# Patient Record
Sex: FEMALE | Race: WHITE | NOT HISPANIC OR LATINO | Employment: FULL TIME | URBAN - METROPOLITAN AREA
[De-identification: names, ages, dates, MRNs, and addresses within clinical notes are randomized per-mention and may not be internally consistent; named-entity substitution may affect disease eponyms.]

---
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
## 2024-01-10 PROCEDURE — 99283 EMERGENCY DEPT VISIT LOW MDM: CPT

## 2024-01-11 ENCOUNTER — HOSPITAL ENCOUNTER (EMERGENCY)
Facility: HOSPITAL | Age: 19
Discharge: HOME | End: 2024-01-11
Attending: EMERGENCY MEDICINE
Payer: OTHER MISCELLANEOUS

## 2024-01-11 VITALS
WEIGHT: 246.25 LBS | TEMPERATURE: 98.6 F | RESPIRATION RATE: 18 BRPM | HEART RATE: 57 BPM | OXYGEN SATURATION: 99 % | BODY MASS INDEX: 37.32 KG/M2 | DIASTOLIC BLOOD PRESSURE: 69 MMHG | SYSTOLIC BLOOD PRESSURE: 120 MMHG | HEIGHT: 68 IN

## 2024-01-11 DIAGNOSIS — S46.912A MUSCLE STRAIN OF LEFT SHOULDER REGION, INITIAL ENCOUNTER: Primary | ICD-10-CM

## 2024-01-11 PROCEDURE — 63710000001 PREDNISONE PER 5 MG: Performed by: NURSE PRACTITIONER

## 2024-01-11 RX ORDER — DICLOFENAC SODIUM 75 MG/1
75 TABLET, DELAYED RELEASE ORAL 2 TIMES DAILY
Qty: 20 TABLET | Refills: 0 | Status: SHIPPED | OUTPATIENT
Start: 2024-01-11 | End: 2024-01-21

## 2024-01-11 RX ORDER — PREDNISONE 50 MG/1
50 TABLET ORAL ONCE
Status: COMPLETED | OUTPATIENT
Start: 2024-01-11 | End: 2024-01-11

## 2024-01-11 RX ORDER — IBUPROFEN 600 MG/1
600 TABLET ORAL ONCE
Status: COMPLETED | OUTPATIENT
Start: 2024-01-11 | End: 2024-01-11

## 2024-01-11 RX ADMIN — IBUPROFEN 600 MG: 600 TABLET, FILM COATED ORAL at 03:36

## 2024-01-11 RX ADMIN — PREDNISONE 50 MG: 50 TABLET ORAL at 03:36

## 2024-01-11 NOTE — DISCHARGE INSTRUCTIONS
Rest, gentle range of motion stretches followed by 20 minutes of ice several times a day.  Take your meds as prescribed.  You may take over-the-counter acetaminophen with these medications as needed for pain.  Call work well after 7:30 in the morning and follow-up with them as directed for further evaluation and treatment.  Return to the emergency department for any acutely developing redness, any increase in swelling, any inability to flex or extend your shoulder or arm or any new or worse concerns.

## 2024-01-11 NOTE — ED PROVIDER NOTES
Time: 10:52 PM EST  Date of encounter:  1/10/2024  Independent Historian/Clinical History and Information was obtained by:   Patient    History is limited by: N/A    Chief Complaint   Patient presents with    Arm Pain         History of Present Illness:  The patient is a 18 y.o. year old female who presents to the emergency department for evaluation of left arm pain for 2 days.  She is complaining of left arm pain with movement.  The patient states that she hurt her arm while moving equipment at work on Monday and believes that she pulled something.  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  She states that she is continue to work all week lifting heavy things and reports that her pain has not improved since then.  She is neurovascular intact.  She is able to flex and extend the shoulder elbow and wrist without difficulties.  She states when she flexes her elbow it does increase the pain in her shoulder and does have tenderness in the anterior portion of her shoulder.  She denies any previous injuries to that shoulder.  She denies numbness and tingling.    Patient Care Team  Primary Care Provider: Ron Briggs MD    Past Medical History:     No Known Allergies  Past Medical History:   Diagnosis Date    Gall stones     Heart murmur      Past Surgical History:   Procedure Laterality Date    CHOLECYSTECTOMY N/A 1/11/2022    Procedure: CHOLECYSTECTOMY LAPAROSCOPIC;  Surgeon: Charanjit Zambrano MD;  Location: [facility]  Service: General;  Laterality: N/A;     History reviewed. No pertinent family history.    Home Medications:  Prior to Admission medications    Medication Sig Start Date End Date Taking? Authorizing Provider   azelastine (ASTELIN) 0.1 % nasal spray 2 sprays into the nostril(s) as directed by provider 2 (Two) Times a Day. Use in each nostril as directed 12/11/22   Larry Murillo PA  "  brompheniramine-pseudoephedrine-DM 30-2-10 MG/5ML syrup Take 10 mL by mouth 4 (Four) Times a Day As Needed for Congestion, Cough or Allergies. 12/11/22   Larry Murillo PA   fluticasone (FLONASE) 50 MCG/ACT nasal spray 2 sprays into the nostril(s) as directed by provider Daily. 12/11/22   Larry Murillo PA   ibuprofen (ADVIL,MOTRIN) 600 MG tablet Take 600 mg by mouth Every 6 (Six) Hours As Needed for Mild Pain .    ProviderSarthak MD   norethindrone-ethinyl estradiol (FEMHRT 1/5) 1-5 MG-MCG tablet Take  by mouth Daily.    ProviderSarthak MD   oxyCODONE-acetaminophen (Percocet) 5-325 MG per tablet Take 1 tablet by mouth Every 6 (Six) Hours As Needed for Moderate Pain . 1/12/22   Charanjit Zambrano MD   polyethylene glycol (MIRALAX) 17 g packet Take 17 g by mouth Daily. 1/12/22   Charanjit Zambrano MD        Social History:   Social History     Tobacco Use    Smoking status: Never    Smokeless tobacco: Never   Vaping Use    Vaping Use: Never used   Substance Use Topics    Alcohol use: Never    Drug use: Never         Review of Systems:  Review of Systems   Constitutional:  Negative for chills and fever.   HENT:  Negative for congestion, ear pain and sore throat.    Eyes:  Negative for pain.   Respiratory:  Negative for cough, chest tightness and shortness of breath.    Cardiovascular:  Negative for chest pain.   Gastrointestinal:  Negative for abdominal pain, diarrhea, nausea and vomiting.   Genitourinary:  Negative for flank pain and hematuria.   Musculoskeletal:  Positive for arthralgias and myalgias. Negative for back pain, joint swelling, neck pain and neck stiffness.   Skin:  Negative for color change, pallor and rash.   Neurological:  Negative for seizures and headaches.   All other systems reviewed and are negative.       Physical Exam:  /85   Pulse 84   Temp 98.6 °F (37 °C) (Oral)   Resp 18   Ht 172.7 cm (68\")   Wt 112 kg (246 lb 4.1 oz)   SpO2 96%   BMI 37.44 kg/m² "         Physical Exam  Vitals and nursing note reviewed.   Constitutional:       General: She is not in acute distress.     Appearance: Normal appearance. She is not ill-appearing or toxic-appearing.   HENT:      Head: Normocephalic and atraumatic.   Eyes:      General: No scleral icterus.     Conjunctiva/sclera: Conjunctivae normal.      Pupils: Pupils are equal, round, and reactive to light.   Cardiovascular:      Rate and Rhythm: Normal rate and regular rhythm.      Pulses: Normal pulses.   Pulmonary:      Effort: Pulmonary effort is normal. No respiratory distress.   Musculoskeletal:         General: Tenderness and signs of injury present. No swelling or deformity. Normal range of motion.      Cervical back: Normal range of motion and neck supple. No rigidity or tenderness.   Lymphadenopathy:      Cervical: No cervical adenopathy.   Skin:     General: Skin is warm and dry.      Capillary Refill: Capillary refill takes less than 2 seconds.      Findings: No bruising, erythema or rash.   Neurological:      General: No focal deficit present.      Mental Status: She is alert and oriented to person, place, and time. Mental status is at baseline.   Psychiatric:         Mood and Affect: Mood normal.         Behavior: Behavior normal.                  Procedures:  Procedures      Medical Decision Making:      Comorbidities that affect care:    Heart murmur, gallstones    External Notes reviewed:    None      The following orders were placed and all results were independently analyzed by me:  No orders of the defined types were placed in this encounter.      Medications Given in the Emergency Department:  Medications   ibuprofen (ADVIL,MOTRIN) tablet 600 mg (600 mg Oral Given 1/11/24 0336)   predniSONE (DELTASONE) tablet 50 mg (50 mg Oral Given 1/11/24 0336)        ED Course:    The patient was initially evaluated in the triage area where orders were placed. The patient was later dispositioned by Sanjuana Pereira  MIRELLA.      The patient was advised to stay for completion of workup which includes but is not limited to communication of labs and radiological results, reassessment and plan. The patient was advised that leaving prior to disposition by a provider could result in critical findings that are not communicated to the patient.     ED Course as of 01/11/24 0351   Wed Speedy 10, 2024   2254 PROVIDER IN TRIAGE  Patient was evaluated by me in triage, Margie VU.  Orders were placed and patient is currently awaiting final results and disposition.  [CT]      ED Course User Index  [CT] Margie Herrera APRN       Labs:    Lab Results (last 24 hours)       ** No results found for the last 24 hours. **             Imaging:    No Radiology Exams Resulted Within Past 24 Hours      Differential Diagnosis and Discussion:      Extremity Pain: Differential diagnosis includes but is not limited to soft tissue sprain, tendonitis, tendon injury, dislocation, fracture, deep vein thrombosis, arterial insufficiency, osteoarthritis, bursitis, and ligamentous damage.  Joint Pain: Differential diagnosis includes but is not limited to polyarticular arthritis, gout, tendinitis, hemarthrosis, septic arthritis, rheumatoid arthritis, bursitis, degenerative joint disease, joint effusion, autoimmune disorder, trauma, and occult neoplasm.    All X-rays impressions were independently interpreted by me.    MDM         Patient Care Considerations:    NARCOTICS: I considered prescribing opiate pain medication as an outpatient, however patient does not require narcotic pain medications.      Consultants/Shared Management Plan:    None    Social Determinants of Health:    Patient is independent, reliable, and has access to care.       Disposition and Care Coordination:    Discharged: The patient is suitable and stable for discharge with no need for consideration of observation or admission.    I have explained the patient´s condition, diagnoses and  treatment plan based on the information available to me at this time. I have answered questions and addressed any concerns. The patient has a good  understanding of the patient´s diagnosis, condition, and treatment plan as can be expected at this point. The vital signs have been stable. The patient´s condition is stable and appropriate for discharge from the emergency department.      The patient will pursue further outpatient evaluation with the primary care physician or other designated or consulting physician as outlined in the discharge instructions. They are agreeable to this plan of care and follow-up instructions have been explained in detail. The patient has received these instructions in written format and have expressed an understanding of the discharge instructions. The patient is aware that any significant change in condition or worsening of symptoms should prompt an immediate return to this or the closest emergency department or call to 1.  I have explained discharge medications and the need for follow up with the patient/caretakers. This was also printed in the discharge instructions. Patient was discharged with the following medications and follow up:      Medication List        New Prescriptions      diclofenac 75 MG EC tablet  Commonly known as: VOLTAREN  Take 1 tablet by mouth 2 (Two) Times a Day for 10 days.               Where to Get Your Medications        These medications were sent to [facility], [place], [place] - [address] [place] [address] - [phone]  - [phone] [address] [address]      Phone: [phone]   diclofenac 75 MG EC tablet      [facility]  [address]  [facility] [address]  [phone]  Call today  FOR FOLLOW UP    Ron Briggs MD  [facility] [facility] [address] [phone]      As needed       Final diagnoses:   Muscle strain of left shoulder region, initial encounter        ED  Disposition       ED Disposition   Discharge    Condition   Stable    Comment   --               This medical record created using voice recognition software.             Sanjuana Pereira, APRN  01/11/24 0358

## 2024-02-01 ENCOUNTER — TRANSCRIBE ORDERS (OUTPATIENT)
Dept: PHYSICAL THERAPY | Facility: CLINIC | Age: 19
End: 2024-02-01
Payer: COMMERCIAL

## 2024-02-01 DIAGNOSIS — S46.912A LEFT SHOULDER STRAIN, INITIAL ENCOUNTER: Primary | ICD-10-CM

## 2024-03-01 ENCOUNTER — TREATMENT (OUTPATIENT)
Dept: PHYSICAL THERAPY | Facility: CLINIC | Age: 19
End: 2024-03-01
Payer: OTHER MISCELLANEOUS

## 2024-03-01 DIAGNOSIS — M62.9 MUSCULOSKELETAL DISORDER INVOLVING UPPER TRAPEZIUS MUSCLE: Primary | ICD-10-CM

## 2024-03-01 DIAGNOSIS — S46.912D STRAIN OF LEFT SHOULDER, SUBSEQUENT ENCOUNTER: ICD-10-CM

## 2024-03-01 DIAGNOSIS — M25.512 LEFT SHOULDER PAIN, UNSPECIFIED CHRONICITY: ICD-10-CM

## 2024-03-01 NOTE — PROGRESS NOTES
Physical Therapy Initial Evaluation and Plan of Care                    [facility] [address] [place] [address]    Patient: Rere Pate   : 2005  Diagnosis/ICD-10 Code:  Musculoskeletal disorder involving upper trapezius muscle [M62.9]  Referring practitioner: MIRELLA Ross  Date of Initial Visit: 3/1/2024  Today's Date: 3/1/2024  Patient seen for 1 sessions           Subjective Questionnaire: QuickDASH: 18. (15.91% disability)      Subjective Evaluation    History of Present Illness  Mechanism of injury: Pt presents to therapy with L shoulder pain. Pt works with really big monitors. On 24 she picked one up and when she put it down, something in her shoulder pulled all the way down to the tip of her fingers. The pull started at the front of her L shoulder and then radiated down the biceps and into the back of the forearm/hand/fingers. A little bit later, she also felt a pull up the left side of her neck. She went to the ER on the . It was just getting worse. The ER said it was just a muscle strain. She followed up and 24 with Julia and they said ti was a pulled muscle. She has been on work restrictions since then. She is no allowed to lift over 5 pounds and she is not allowed to do any excessive movements.     Currently the pain is in the front of the shoulder and radiates down the biceps and ends just proximal to the elbow. Holding her arm in a position where it is outstretched for a couple of minutes hurts along the proximal biceps. Thus, driving bother her because she drives with the L arm.  Picking up certain things hurts. It is not disrupting her sleep.     Pt denies any numbness or tingling currently or at the time of injury.     Goals: to get rid of the pulling pain     Medical Hx: heart murmur, gallbladder removal surgery approx 3 years ago.     Pain  Current pain ratin  At best pain ratin  At worst pain ratin  Quality: tight, pulling and  discomfort  Relieving factors: rest, heat and medications  Aggravating factors: outstretched reach, repetitive movement, overhead activity, prolonged positioning, lifting, movement and keyboarding    Hand dominance: right    Patient Goals  Patient goals for therapy: increased strength, return to sport/leisure activities, return to work, independence with ADLs/IADLs, decreased pain and increased motion             Objective          Palpation   Left   No palpable tenderness to the infraspinatus, middle deltoid, subscapularis, supraspinatus, teres major and teres minor.   Hypertonic in the upper trapezius.   Tenderness of the upper trapezius.     Additional Palpation Details  Tingling down left arm to fingers with palpation to L UT    Tenderness     Left Shoulder   No tenderness in the AC joint, acromion, biceps tendon (proximal), bicipital groove, coracoid process, infraspinatus tendon, subscapularis tendon and supraspinatus tendon.     Active Range of Motion   Left Shoulder   Flexion: 143 degrees with pain  Abduction: 133 degrees   External rotation 45°: 90 degrees   Internal rotation 45°: 90 degrees     Right Shoulder   Flexion: WFL  Abduction: WFL  External rotation 45°: WFL  Internal rotation 45°: WFL    Passive Range of Motion   Left Shoulder   Flexion: WFL  Abduction: WFL  External rotation 90°: WFL  Internal rotation 90°: WFL    Right Shoulder   Flexion: WFL  Abduction: WFL  External rotation 0°: WFL  Internal rotation 0°: WFL    Strength/Myotome Testing     Left Shoulder     Planes of Motion   Flexion: 4+   Abduction: 4+   External rotation at 0°: 4+   Internal rotation at 0°: 4 (pain in biceps region)     Right Shoulder     Planes of Motion   Flexion: 5   Abduction: 5   External rotation at 0°: 5   Internal rotation at 0°: 5     Tests   Cervical     Left   Negative ULTT1, ULTT2 and ULTT3.     Right   Negative ULTT1, ULTT2, ULTT3 and ULTT4.     Left Shoulder   Positive Neer's, painful arc and Speed's.    Negative empty can, external rotation lag sign, Hawkin's, internal rotation lag sign and lift-off.       See Exercise, Manual, and Modality Logs for complete treatment.     Assessment & Plan       Assessment  Impairments: abnormal muscle firing, abnormal muscle tone, abnormal or restricted ROM, activity intolerance, impaired physical strength, lacks appropriate home exercise program, pain with function and safety issue   Functional limitations: carrying objects, lifting, pulling, pushing, reaching behind back, reaching overhead and unable to perform repetitive tasks   Assessment details: Pt presents to therapy with complaints of L shoulder pain. Subjective information and results of objective testing are consistent with biceps strain/supraspinatus strain and/or upper trapezius strain. Pt has associated shoulder stiffness, weakness and functional deficits (QuickDASH). Skilled therapy required in order to address the aforementioned impairments so that she can return to her PLOF with work activities, such as lifting.   Prognosis: good    Goals  Plan Goals: SHOULDER  PROBLEMS:     1. The patient has limited ROM of the L  shoulder.    LTG 1: 12 weeks:  The patient will demonstrate 180 degrees of L shoulder flexion and 180 degrees of shoulder abduction to allow the patient to reach into upper kitchen cabinets and manipulate clothing behind the back with greater ease.    STATUS:  New   STG 1a: 6 weeks:  The patient will demonstrate 160 degrees of L shoulder flexion and 160 degrees of shoulder abduction.    STATUS:  New    2. The patient has limited strength of the L shoulder.   LTG 2: 12 weeks:  The patient will demonstrate 5 /5 strength for L shoulder flexion, abduction, external rotation, and internal rotation in order to demonstrate improved shoulder stability.    STATUS:  New   STG 2a: 6 weeks:  The patient will demonstrate 4+ /5 strength for L shoulder flexion, abduction, external rotation, and internal  rotation.    STATUS:  New   STG2b:  6 weeks:  The patient will be independent with home exercises.     STATUS:  New     3. The patient complains of pain to the L shoulder.   LTG 3: 12 weeks:  The patient will report a pain rating of 1 /10 or better, on average in the past week, in order to improve sleep quality and tolerance to performance of activities of daily living.    STATUS:  New   STG 3a: 6 weeks:  The patient will report a pain rating of 3 /10 or better, on average in the past week.    STATUS:  New    4. Carrying, Moving, and Handling Objects Functional Limitation     LTG 4: 12 weeks:  The patient will demonstrate 5% disability or less on the QuickDASH, in order to allow pt to return to normal work duty.    STATUS:  New   STG 4a: 6 weeks:  The patient will demonstrate 10 % disability or less on the QuickDASH, in order to allow pt to return to normal work duty.    STATUS:  New     Plan  Therapy options: will be seen for skilled therapy services  Planned modality interventions: cryotherapy, electrical stimulation/Russian stimulation and TENS  Planned therapy interventions: ADL retraining, soft tissue mobilization, strengthening, stretching, therapeutic activities, joint mobilization, home exercise program, functional ROM exercises, flexibility, body mechanics training, postural training, neuromuscular re-education, manual therapy and motor coordination training  Frequency: 3x week  Duration in weeks: 12  Treatment plan discussed with: patient        Visit Diagnoses:    ICD-10-CM ICD-9-CM   1. Musculoskeletal disorder involving upper trapezius muscle  M62.9 723.9   2. Left shoulder pain, unspecified chronicity  M25.512 719.41   3. Strain of left shoulder, subsequent encounter  S46.912D V58.89     840.9       History # of Personal Factors and/or Comorbidities: LOW (0)  Examination of Body System(s): # of elements: LOW (1-2)  Clinical Presentation: STABLE   Clinical Decision Making: LOW       Timed:          Manual Therapy:    0     mins  31787;     Therapeutic Exercise:    10     mins  22187;     Neuromuscular Abby:    0    mins  49303;    Therapeutic Activity:     0     mins  32658;     Gait Trainin     mins  74076;     Ultrasound:     0     mins  24250;    Ionto                               0    mins   42836  Self Care                       0     mins   55456  Canalith Repos    0     mins 29344      Un-Timed:  Electrical Stimulation:    0     mins  00806 (MC );  Dry Needling     0     mins self-pay  Traction     0     mins 80060  Low Eval     40     Mins  78978  Mod Eval     0     Mins  13050  High Eval                       0     Mins  54770  Re-Eval                           0    mins  23765    Timed Treatment:   10   mins   Total Treatment:     50   mins    PT SIGNATURE: Kenya Brandt PT    Electronically signed 3/1/2024    [place] License: PT - [id number]      Initial Certification  Certification Period: 3/1/2024 thru 2024  I certify that the therapy services are furnished while this patient is under my care.  The services outlined above are required by this patient, and will be reviewed every 90 days.     PHYSICIAN: Temitope Rivas APRN  NPI: [id number]      DATE:     Please sign and return via fax to [phone]. Thank you, [facility].

## 2024-03-05 ENCOUNTER — TREATMENT (OUTPATIENT)
Dept: PHYSICAL THERAPY | Facility: CLINIC | Age: 19
End: 2024-03-05
Payer: OTHER MISCELLANEOUS

## 2024-03-05 DIAGNOSIS — S46.912D STRAIN OF LEFT SHOULDER, SUBSEQUENT ENCOUNTER: ICD-10-CM

## 2024-03-05 DIAGNOSIS — M62.9 MUSCULOSKELETAL DISORDER INVOLVING UPPER TRAPEZIUS MUSCLE: Primary | ICD-10-CM

## 2024-03-05 DIAGNOSIS — M25.512 LEFT SHOULDER PAIN, UNSPECIFIED CHRONICITY: ICD-10-CM

## 2024-03-05 PROCEDURE — 97110 THERAPEUTIC EXERCISES: CPT | Performed by: PHYSICAL THERAPIST

## 2024-03-05 PROCEDURE — 97530 THERAPEUTIC ACTIVITIES: CPT | Performed by: PHYSICAL THERAPIST

## 2024-03-05 NOTE — PROGRESS NOTES
Physical Therapy Daily Treatment Note      Patient: Rere Pate   : 2005  Referring practitioner: MIRELLA Ross  Date of Initial Visit: Type: THERAPY  Noted: 3/1/2024  Today's Date: 3/5/2024  Patient seen for 2 sessions           Subjective Questionnaire:       Subjective Evaluation    History of Present Illness    Subjective comment: Pt reports L upper trap pain 3/10 today and states pain increases with repetitive motion.       Objective   See Exercise, Manual, and Modality Logs for complete treatment.       Assessment & Plan       Assessment  Assessment details: Pt reports prone extension caused pt deltoid burning /10.  Pt tolerated all other exercise well.  Pt will benefit from continued PT to return to PLOF to include return to full duty as pt is on restricted duty.  Continue with POC.        Visit Diagnoses:    ICD-10-CM ICD-9-CM   1. Musculoskeletal disorder involving upper trapezius muscle  M62.9 723.9   2. Left shoulder pain, unspecified chronicity  M25.512 719.41   3. Strain of left shoulder, subsequent encounter  S46.912D V58.89     840.9       Progress per Plan of Care and Progress strengthening /stabilization /functional activity           Timed:  Manual Therapy:         mins  91538;  Therapeutic Exercise:    16     mins  91754;     Neuromuscular Abby:        mins  79888;    Therapeutic Activity:     8     mins  58181;     Gait Training:           mins  59079;     Ultrasound:          mins  10809;    Electrical Stimulation:         mins  63063 ( );  Aquatic Therapy          mins  12743    Untimed:  Electrical Stimulation:         mins  00064 ( );  Mechanical Traction:         mins  01033;     Timed Treatment:   24   mins   Total Treatment:     24   mins    Electronically signed    Toshia Escalera PTA  Physical Therapist Assistant    TOBY license: [id number]

## 2024-03-06 ENCOUNTER — TREATMENT (OUTPATIENT)
Dept: PHYSICAL THERAPY | Facility: CLINIC | Age: 19
End: 2024-03-06
Payer: OTHER MISCELLANEOUS

## 2024-03-06 DIAGNOSIS — M25.512 LEFT SHOULDER PAIN, UNSPECIFIED CHRONICITY: ICD-10-CM

## 2024-03-06 DIAGNOSIS — M62.9 MUSCULOSKELETAL DISORDER INVOLVING UPPER TRAPEZIUS MUSCLE: Primary | ICD-10-CM

## 2024-03-06 DIAGNOSIS — S46.912D STRAIN OF LEFT SHOULDER, SUBSEQUENT ENCOUNTER: ICD-10-CM

## 2024-03-06 NOTE — PROGRESS NOTES
Physical Therapy Daily Treatment Note      Patient: Rere Pate   : 2005  Referring practitioner: MIRELLA Ross  Date of Initial Visit: Type: THERAPY  Noted: 3/1/2024  Today's Date: 3/6/2024  Patient seen for 3 sessions           Subjective Questionnaire:       Subjective Evaluation    History of Present Illness    Subjective comment: Pt reported being muscle sore after last session and her upper trap and shoulder pain were the same.  Pain today 2/10.       Objective   See Exercise, Manual, and Modality Logs for complete treatment.       Assessment & Plan       Assessment  Assessment details: Pt is a 5 lb lifting restriction at work.  Pt tolerated session well.  Pt reported feeling good after manual work today with decreased anterior shoulder pain and neck pain.  Continue with POC.        Visit Diagnoses:    ICD-10-CM ICD-9-CM   1. Musculoskeletal disorder involving upper trapezius muscle  M62.9 723.9   2. Strain of left shoulder, subsequent encounter  S46.912D V58.89     840.9   3. Left shoulder pain, unspecified chronicity  M25.512 719.41       Progress per Plan of Care and Progress strengthening /stabilization /functional activity           Timed:  Manual Therapy:    8     mins  20321;  Therapeutic Exercise:    14     mins  36194;     Neuromuscular Abby:        mins  36579;    Therapeutic Activity:     8     mins  82549;     Gait Training:           mins  46264;     Ultrasound:          mins  19241;    Electrical Stimulation:         mins  68202 ( );  Aquatic Therapy          mins  16482    Untimed:  Electrical Stimulation:         mins  40392 ( );  Mechanical Traction:         mins  76904;     Timed Treatment:   30   mins   Total Treatment:     30   mins    Electronically signed    Toshia Escalera PTA  Physical Therapist Assistant    TOBY license: [id number]

## 2024-03-08 ENCOUNTER — TREATMENT (OUTPATIENT)
Dept: PHYSICAL THERAPY | Facility: CLINIC | Age: 19
End: 2024-03-08
Payer: OTHER MISCELLANEOUS

## 2024-03-08 DIAGNOSIS — M62.9 MUSCULOSKELETAL DISORDER INVOLVING UPPER TRAPEZIUS MUSCLE: Primary | ICD-10-CM

## 2024-03-08 DIAGNOSIS — S46.912D STRAIN OF LEFT SHOULDER, SUBSEQUENT ENCOUNTER: ICD-10-CM

## 2024-03-08 DIAGNOSIS — M25.512 LEFT SHOULDER PAIN, UNSPECIFIED CHRONICITY: ICD-10-CM

## 2024-03-08 PROCEDURE — 97530 THERAPEUTIC ACTIVITIES: CPT

## 2024-03-08 PROCEDURE — 97110 THERAPEUTIC EXERCISES: CPT

## 2024-03-08 NOTE — PROGRESS NOTES
Physical Therapy Daily Treatment Note                      [facility] [address] [place] [address]    Patient: Rere Pate   : 2005  Diagnosis/ICD-10 Code:  Musculoskeletal disorder involving upper trapezius muscle [M62.9]  Referring practitioner: MIRELLA Ross  Date of Initial Visit: Type: THERAPY  Noted: 3/1/2024  Today's Date: 3/8/2024  Patient seen for 4 sessions           Subjective   The patient reported that she is doing well today. Pt feels like the shoulder/neck is improving. Pt feels like the exercise helps more than the STM.     Objective   See Exercise, Manual, and Modality Logs for complete treatment.     Assessment/Plan  Pt tolerated today's session well. Pt was able to perform higher level shoulder and scapular strengthening exercises today, indicating improved endurance and strength of the shoulder girdle. Pt progressing well, as evidenced by overall decrease in L shoulder pain. However, she is still not back to her PLOF. Skilled therapy still required in order to decrease L shoulder pain and improve strength/endurance/stability so that she can return to her PLOF with work activities, such as lifting. Continue POC.        Timed:  Manual Therapy:    0     mins  81502;  Therapeutic Exercise:    23     mins  65003;     Neuromuscular Abby:   0    mins  31286;    Therapeutic Activity:     8     mins  12607;     Gait Trainin     mins  07240;     Aquatics                         0      mins  84197    Un-timed:  Mechanical Traction      0     mins  57603  Dry Needling     0     mins self-pay  Electrical Stimulation:    0     mins  28035 ( );      Timed Treatment:   31   mins   Total Treatment:     31   mins    Kenya Brandt PT    Electronically signed 3/8/2024    [place] License: PT - [id number]

## 2024-03-11 ENCOUNTER — TREATMENT (OUTPATIENT)
Dept: PHYSICAL THERAPY | Facility: CLINIC | Age: 19
End: 2024-03-11
Payer: OTHER MISCELLANEOUS

## 2024-03-11 DIAGNOSIS — M25.512 LEFT SHOULDER PAIN, UNSPECIFIED CHRONICITY: ICD-10-CM

## 2024-03-11 DIAGNOSIS — M62.9 MUSCULOSKELETAL DISORDER INVOLVING UPPER TRAPEZIUS MUSCLE: Primary | ICD-10-CM

## 2024-03-11 DIAGNOSIS — S46.912D STRAIN OF LEFT SHOULDER, SUBSEQUENT ENCOUNTER: ICD-10-CM

## 2024-03-11 PROCEDURE — 97530 THERAPEUTIC ACTIVITIES: CPT | Performed by: PHYSICAL THERAPIST

## 2024-03-11 PROCEDURE — 97110 THERAPEUTIC EXERCISES: CPT | Performed by: PHYSICAL THERAPIST

## 2024-03-11 NOTE — PROGRESS NOTES
Physical Therapy Daily Treatment Note      Patient: Rere Pate   : 2005  Referring practitioner: MIRELLA Ross  Date of Initial Visit: Type: THERAPY  Noted: 3/1/2024  Today's Date: 3/11/2024  Patient seen for 5 sessions           Subjective Questionnaire:       Subjective Evaluation    History of Present Illness    Subjective comment: Pt reports L shoulder/upper trap feel about the same 4/10.       Objective   See Exercise, Manual, and Modality Logs for complete treatment.       Assessment & Plan       Assessment  Assessment details: Pt tolerated strengthening well and had no report of increased pain or discomfort.  Pt will benefit from continued PT to return to PLOF to include back to work.        Visit Diagnoses:    ICD-10-CM ICD-9-CM   1. Musculoskeletal disorder involving upper trapezius muscle  M62.9 723.9   2. Strain of left shoulder, subsequent encounter  S46.912D V58.89     840.9   3. Left shoulder pain, unspecified chronicity  M25.512 719.41       Progress per Plan of Care and Progress strengthening /stabilization /functional activity           Timed:  Manual Therapy:         mins  79595;  Therapeutic Exercise:   14      mins  75662;     Neuromuscular Abby:        mins  41045;    Therapeutic Activity:     10      mins  30531;     Gait Training:           mins  17772;     Ultrasound:          mins  93440;    Electrical Stimulation:         mins  36434 ( );  Aquatic Therapy          mins  32177    Untimed:  Electrical Stimulation:         mins  49842 ( );  Mechanical Traction:         mins  54548;     Timed Treatment:   24   mins   Total Treatment:     24   mins    Electronically signed    Toshia Escalera PTA  Physical Therapist Assistant    TOBY license: [id number]

## 2024-03-13 ENCOUNTER — TREATMENT (OUTPATIENT)
Dept: PHYSICAL THERAPY | Facility: CLINIC | Age: 19
End: 2024-03-13
Payer: OTHER MISCELLANEOUS

## 2024-03-13 DIAGNOSIS — S46.912D STRAIN OF LEFT SHOULDER, SUBSEQUENT ENCOUNTER: ICD-10-CM

## 2024-03-13 DIAGNOSIS — M62.9 MUSCULOSKELETAL DISORDER INVOLVING UPPER TRAPEZIUS MUSCLE: Primary | ICD-10-CM

## 2024-03-13 DIAGNOSIS — M25.512 LEFT SHOULDER PAIN, UNSPECIFIED CHRONICITY: ICD-10-CM

## 2024-03-13 PROCEDURE — 97110 THERAPEUTIC EXERCISES: CPT | Performed by: PHYSICAL THERAPIST

## 2024-03-13 PROCEDURE — 97530 THERAPEUTIC ACTIVITIES: CPT | Performed by: PHYSICAL THERAPIST

## 2024-03-13 NOTE — PROGRESS NOTES
Physical Therapy Daily Treatment Note      Patient: Rere Pate   : 2005  Referring practitioner: MIRELLA Ross  Date of Initial Visit: Type: THERAPY  Noted: 3/1/2024  Today's Date: 3/13/2024  Patient seen for 6 sessions           Subjective Questionnaire:       Subjective Evaluation    History of Present Illness    Subjective comment: Pt reported 1/10 R quadrant pain.  Pt reports pain increases with lifting as in yesterday when she carried in the groceries.       Objective   See Exercise, Manual, and Modality Logs for complete treatment.       Assessment & Plan       Assessment  Assessment details: Pt reports increased pain with lifting and carrying.  Pt reported increased wt with Cybex extension and second set of external rotation at 90 degrees ABD.  Pt will benefit from continued PT.        Visit Diagnoses:    ICD-10-CM ICD-9-CM   1. Musculoskeletal disorder involving upper trapezius muscle  M62.9 723.9   2. Strain of left shoulder, subsequent encounter  S46.912D V58.89     840.9   3. Left shoulder pain, unspecified chronicity  M25.512 719.41       Progress per Plan of Care and Progress strengthening /stabilization /functional activity           Timed:  Manual Therapy:         mins  39834;  Therapeutic Exercise:    23     mins  38424;     Neuromuscular Abby:        mins  58135;    Therapeutic Activity:     8     mins  88447;     Gait Training:           mins  91454;     Ultrasound:          mins  91309;    Electrical Stimulation:         mins  00641 ( );  Aquatic Therapy          mins  67169    Untimed:  Electrical Stimulation:         mins  89166 ( );  Mechanical Traction:         mins  98842;     Timed Treatment:   31   mins   Total Treatment:     31   mins    Electronically signed    Toshia Escalera PTA  Physical Therapist Assistant    TOBY license: [id number]

## 2024-03-15 ENCOUNTER — TREATMENT (OUTPATIENT)
Dept: PHYSICAL THERAPY | Facility: CLINIC | Age: 19
End: 2024-03-15
Payer: OTHER MISCELLANEOUS

## 2024-03-15 DIAGNOSIS — M62.9 MUSCULOSKELETAL DISORDER INVOLVING UPPER TRAPEZIUS MUSCLE: Primary | ICD-10-CM

## 2024-03-15 DIAGNOSIS — S46.912D STRAIN OF LEFT SHOULDER, SUBSEQUENT ENCOUNTER: ICD-10-CM

## 2024-03-15 DIAGNOSIS — M25.512 LEFT SHOULDER PAIN, UNSPECIFIED CHRONICITY: ICD-10-CM

## 2024-03-15 NOTE — PROGRESS NOTES
Physical Therapy Daily Treatment Note                      [facility] [address] [facility] [place] [address]    Patient: Rere Pate   : 2005  Diagnosis/ICD-10 Code:  Musculoskeletal disorder involving upper trapezius muscle [M62.9]  Referring practitioner: MIRELLA Ross  Date of Initial Visit: Type: THERAPY  Noted: 3/1/2024  Today's Date: 3/15/2024  Patient seen for 7 sessions           Subjective   The patient reported that her neck is feeling better.     Objective   See Exercise, Manual, and Modality Logs for complete treatment.     Assessment/Plan  Pt tolerated today's treatment well. Pt was able to increase weight level and/or amount of repetitions for many of her exercises today, indicating improvements in shoulder and scapular strength/endurance. Pt is improving, as evidenced by overall decrease in neck/shoulder pain. However, her current amount of pain is still impacting her functional abilities. Skilled therapy still required in order to decrease L shoulder/neck pain and improve strength/endurance/stability so that she can return to her PLOF with work activities, such as lifting. Continue POC.         Timed:  Manual Therapy:    0     mins  97822;  Therapeutic Exercise:    8     mins  30110;     Neuromuscular Abby:   0    mins  33903;    Therapeutic Activity:     23     mins  64385;     Gait Trainin     mins  19052;     Aquatics                         0      mins  59882    Un-timed:  Mechanical Traction      0     mins  10304  Dry Needling     0     mins self-pay  Electrical Stimulation:    0     mins  70790 ( );      Timed Treatment:   31   mins   Total Treatment:     31   mins    Kenya Brandt PT    Electronically signed 3/15/2024    [place] License: PT - [id number]

## 2024-03-20 ENCOUNTER — TREATMENT (OUTPATIENT)
Dept: PHYSICAL THERAPY | Facility: CLINIC | Age: 19
End: 2024-03-20
Payer: OTHER MISCELLANEOUS

## 2024-03-20 DIAGNOSIS — S46.912D STRAIN OF LEFT SHOULDER, SUBSEQUENT ENCOUNTER: ICD-10-CM

## 2024-03-20 DIAGNOSIS — M25.512 LEFT SHOULDER PAIN, UNSPECIFIED CHRONICITY: ICD-10-CM

## 2024-03-20 DIAGNOSIS — M62.9 MUSCULOSKELETAL DISORDER INVOLVING UPPER TRAPEZIUS MUSCLE: Primary | ICD-10-CM

## 2024-03-20 PROCEDURE — 97110 THERAPEUTIC EXERCISES: CPT | Performed by: PHYSICAL THERAPIST

## 2024-03-20 PROCEDURE — 97530 THERAPEUTIC ACTIVITIES: CPT | Performed by: PHYSICAL THERAPIST

## 2024-03-20 NOTE — PROGRESS NOTES
Physical Therapy Daily Treatment Note      Patient: Rere Pate   : 2005  Referring practitioner: MIRELLA Ross  Date of Initial Visit: Type: THERAPY  Noted: 3/1/2024  Today's Date: 3/20/2024  Patient seen for 8 sessions           Subjective Questionnaire:       Subjective Evaluation    History of Present Illness    Subjective comment: Pt reported 2/10 L upper trap pain and reports it has been staying that way.  Pt reports her neck hurts.       Objective   See Exercise, Manual, and Modality Logs for complete treatment.       Assessment & Plan       Assessment  Assessment details: Pt tolerated strengthening well and is progressing.  Pt does report L side neck pain.  Pt is off work and will benefit from continued PT to return to work.  Continue with POC.        Visit Diagnoses:    ICD-10-CM ICD-9-CM   1. Musculoskeletal disorder involving upper trapezius muscle  M62.9 723.9   2. Strain of left shoulder, subsequent encounter  S46.912D V58.89     840.9   3. Left shoulder pain, unspecified chronicity  M25.512 719.41       Progress per Plan of Care and Progress strengthening /stabilization /functional activity           Timed:  Manual Therapy:         mins  01799;  Therapeutic Exercise:    23     mins  79239;     Neuromuscular Abby:        mins  31312;    Therapeutic Activity:     8     mins  61701;     Gait Training:           mins  24930;     Ultrasound:          mins  08498;    Electrical Stimulation:         mins  08593 ( );  Aquatic Therapy          mins  92337    Untimed:  Electrical Stimulation:         mins  22381 ( );  Mechanical Traction:         mins  57301;     Timed Treatment:   31   mins   Total Treatment:     31   mins    Electronically signed    Toshia Escalera PTA  Physical Therapist Assistant    TOBY license: [id number]

## 2024-03-22 ENCOUNTER — TREATMENT (OUTPATIENT)
Dept: PHYSICAL THERAPY | Facility: CLINIC | Age: 19
End: 2024-03-22
Payer: OTHER MISCELLANEOUS

## 2024-03-22 DIAGNOSIS — M25.512 LEFT SHOULDER PAIN, UNSPECIFIED CHRONICITY: ICD-10-CM

## 2024-03-22 DIAGNOSIS — M62.9 MUSCULOSKELETAL DISORDER INVOLVING UPPER TRAPEZIUS MUSCLE: Primary | ICD-10-CM

## 2024-03-22 DIAGNOSIS — S46.912D STRAIN OF LEFT SHOULDER, SUBSEQUENT ENCOUNTER: ICD-10-CM

## 2024-03-25 ENCOUNTER — TREATMENT (OUTPATIENT)
Dept: PHYSICAL THERAPY | Facility: CLINIC | Age: 19
End: 2024-03-25
Payer: OTHER MISCELLANEOUS

## 2024-03-25 DIAGNOSIS — S46.912D STRAIN OF LEFT SHOULDER, SUBSEQUENT ENCOUNTER: Primary | ICD-10-CM

## 2024-03-25 DIAGNOSIS — M62.9 MUSCULOSKELETAL DISORDER INVOLVING UPPER TRAPEZIUS MUSCLE: ICD-10-CM

## 2024-03-25 DIAGNOSIS — M25.512 LEFT SHOULDER PAIN, UNSPECIFIED CHRONICITY: ICD-10-CM

## 2024-03-25 PROCEDURE — 97530 THERAPEUTIC ACTIVITIES: CPT | Performed by: PHYSICAL THERAPIST

## 2024-03-25 PROCEDURE — 97110 THERAPEUTIC EXERCISES: CPT | Performed by: PHYSICAL THERAPIST

## 2024-03-25 NOTE — PROGRESS NOTES
Physical Therapy Daily Treatment Note      Patient: Rere Pate   : 2005  Referring practitioner: MIRELLA Ross  Date of Initial Visit: Type: THERAPY  Noted: 3/1/2024  Today's Date: 3/25/2024  Patient seen for 10 sessions           Subjective Questionnaire:       Subjective Evaluation    History of Present Illness    Subjective comment: Pt reports 3/10 L upper trap pain that goes down lateral       Objective   See Exercise, Manual, and Modality Logs for complete treatment.       Assessment & Plan       Assessment  Assessment details: Pt reports continued upper trap pain that has improved since starting PT.  Pt tolerated strengthening well not having any difficulty with ER at 90/90 with red tube.  Pt will benefit from continued PT.  Continue with POC.        Visit Diagnoses:    ICD-10-CM ICD-9-CM   1. Strain of left shoulder, subsequent encounter  S46.912D V58.89     840.9   2. Left shoulder pain, unspecified chronicity  M25.512 719.41   3. Musculoskeletal disorder involving upper trapezius muscle  M62.9 723.9       Progress per Plan of Care and Progress strengthening /stabilization /functional activity           Timed:  Manual Therapy:         mins  76774;  Therapeutic Exercise:    22     mins  77652;     Neuromuscular Abby:        mins  31180;    Therapeutic Activity:     8     mins  90301;     Gait Training:           mins  22708;     Ultrasound:          mins  13834;    Electrical Stimulation:         mins  91735 ( );  Aquatic Therapy          mins  95210    Untimed:  Electrical Stimulation:         mins  77774 ( );  Mechanical Traction:         mins  01542;     Timed Treatment:   30   mins   Total Treatment:     30   mins    Electronically signed    Toshia Escalera PTA  Physical Therapist Assistant    TOBY license: [id number]

## 2024-03-27 ENCOUNTER — TREATMENT (OUTPATIENT)
Dept: PHYSICAL THERAPY | Facility: CLINIC | Age: 19
End: 2024-03-27
Payer: OTHER MISCELLANEOUS

## 2024-03-27 DIAGNOSIS — M25.512 LEFT SHOULDER PAIN, UNSPECIFIED CHRONICITY: ICD-10-CM

## 2024-03-27 DIAGNOSIS — M62.9 MUSCULOSKELETAL DISORDER INVOLVING UPPER TRAPEZIUS MUSCLE: ICD-10-CM

## 2024-03-27 DIAGNOSIS — S46.912D STRAIN OF LEFT SHOULDER, SUBSEQUENT ENCOUNTER: Primary | ICD-10-CM

## 2024-03-27 NOTE — PROGRESS NOTES
"   Physical Therapy Daily Treatment Note                      [facility] [address] [facility] [place] [address]    Patient: Rere Pate   : 2005  Diagnosis/ICD-10 Code:  Strain of left shoulder, subsequent encounter [S46.912D]  Referring practitioner: MIRELLA Ross  Date of Initial Visit: Type: THERAPY  Noted: 3/1/2024  Today's Date: 3/27/2024  Patient seen for 11 sessions           Subjective   The patient reported that her shoulder is \"eusebio\" today.     Objective   See Exercise, Manual, and Modality Logs for complete treatment.     Assessment/Plan  Pt tolerated today's treatment well. Pt was able to increase number of repetitions for many of her exercises/activities today, indicating improved endurance of shoulder girdle. Pt is improving, as evidenced by overall decrease in neck/shoulder pain. However, her current amount of pain is still impacting her functional abilities. Skilled therapy still required in order to decrease L shoulder/neck pain and improve strength/endurance/stability so that she can return to her PLOF with work activities, such as lifting. Continue POC.          Timed:  Manual Therapy:    0     mins  65884;  Therapeutic Exercise:    8     mins  63412;     Neuromuscular Abby:   0    mins  68687;    Therapeutic Activity:     23     mins  12175;     Gait Trainin     mins  22716;     Aquatics                         0      mins  27835    Un-timed:  Mechanical Traction      0     mins  28114  Dry Needling     0     mins self-pay  Electrical Stimulation:    0     mins  17469 ( );      Timed Treatment:   31   mins   Total Treatment:     31   mins    Kenya Brandt PT    Electronically signed 3/27/2024    [place] License: PT - [id number]                       "

## 2024-04-01 ENCOUNTER — TREATMENT (OUTPATIENT)
Dept: PHYSICAL THERAPY | Facility: CLINIC | Age: 19
End: 2024-04-01
Payer: OTHER MISCELLANEOUS

## 2024-04-01 DIAGNOSIS — M25.512 LEFT SHOULDER PAIN, UNSPECIFIED CHRONICITY: ICD-10-CM

## 2024-04-01 DIAGNOSIS — M62.9 MUSCULOSKELETAL DISORDER INVOLVING UPPER TRAPEZIUS MUSCLE: ICD-10-CM

## 2024-04-01 DIAGNOSIS — S46.912D STRAIN OF LEFT SHOULDER, SUBSEQUENT ENCOUNTER: Primary | ICD-10-CM

## 2024-04-01 NOTE — PROGRESS NOTES
Physical Therapy Daily Treatment Note      Patient: Rere Pate   : 2005  Referring practitioner: MIRELLA Ross  Date of Initial Visit: Type: THERAPY  Noted: 3/1/2024  Today's Date: 2024  Patient seen for 12 sessions           Subjective Questionnaire:       Subjective Evaluation    History of Present Illness    Subjective comment: Pt reports not having any L shoulder pain.  Pt reports Mr. Collier told her to end her visits and finish PT which is today.       Objective          Active Range of Motion   Left Shoulder   Flexion: 180 degrees   Adduction: 180 degrees   External rotation BTH: T6   Internal rotation 45°: 86 degrees     Strength/Myotome Testing     Left Shoulder     Planes of Motion   Flexion: 5   Abduction: 5   External rotation at 0°: 5   Internal rotation at 90°: 5       See Exercise, Manual, and Modality Logs for complete treatment.       Assessment & Plan       Assessment  Assessment details: Pt has met all goals.  Pt reports she will return to work in two weeks.  Pt scored 18 on [employer].  Pt will d/c today.        Visit Diagnoses:    ICD-10-CM ICD-9-CM   1. Strain of left shoulder, subsequent encounter  S46.912D V58.89     840.9   2. Left shoulder pain, unspecified chronicity  M25.512 719.41   3. Musculoskeletal disorder involving upper trapezius muscle  M62.9 723.9       Pt will discharge today per MD per pt.           Timed:  Manual Therapy:         mins  02732;  Therapeutic Exercise:    20     mins  39645;     Neuromuscular Abby:        mins  95862;    Therapeutic Activity:     8     mins  78176;     Gait Training:           mins  77306;     Ultrasound:          mins  67920;    Electrical Stimulation:         mins  19801 ( );  Aquatic Therapy          mins  69959    Untimed:  Electrical Stimulation:         mins  59460 ( );  Mechanical Traction:         mins  49878;     Timed Treatment:   28   mins   Total Treatment:     28   mins    Electronically  signed    Toshia Escalera PTA  Physical Therapist Assistant    TOBY license: [id number]

## 2024-04-08 ENCOUNTER — TELEPHONE (OUTPATIENT)
Dept: ORTHOPEDICS | Facility: OTHER | Age: 19
End: 2024-04-08
Payer: COMMERCIAL

## 2024-06-09 ENCOUNTER — HOSPITAL ENCOUNTER (EMERGENCY)
Facility: HOSPITAL | Age: 19
Discharge: HOME | End: 2024-06-09
Attending: EMERGENCY MEDICINE | Admitting: EMERGENCY MEDICINE
Payer: MEDICAID

## 2024-06-09 ENCOUNTER — APPOINTMENT (OUTPATIENT)
Dept: CT IMAGING | Facility: HOSPITAL | Age: 19
End: 2024-06-09
Payer: MEDICAID

## 2024-06-09 ENCOUNTER — APPOINTMENT (OUTPATIENT)
Dept: GENERAL RADIOLOGY | Facility: HOSPITAL | Age: 19
End: 2024-06-09
Payer: MEDICAID

## 2024-06-09 VITALS
DIASTOLIC BLOOD PRESSURE: 71 MMHG | TEMPERATURE: 98.6 F | WEIGHT: 252.65 LBS | BODY MASS INDEX: 38.29 KG/M2 | OXYGEN SATURATION: 99 % | HEIGHT: 68 IN | SYSTOLIC BLOOD PRESSURE: 102 MMHG | RESPIRATION RATE: 24 BRPM | HEART RATE: 81 BPM

## 2024-06-09 DIAGNOSIS — S93.602A FOOT SPRAIN, LEFT, INITIAL ENCOUNTER: ICD-10-CM
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
## 2024-06-09 DIAGNOSIS — S20.212A RIB CONTUSION, LEFT, INITIAL ENCOUNTER: ICD-10-CM

## 2024-06-09 DIAGNOSIS — S50.02XA CONTUSION OF LEFT ELBOW, INITIAL ENCOUNTER: ICD-10-CM

## 2024-06-09 DIAGNOSIS — S00.83XA CONTUSION OF FACE, INITIAL ENCOUNTER: ICD-10-CM

## 2024-06-09 PROCEDURE — 99284 EMERGENCY DEPT VISIT MOD MDM: CPT

## 2024-06-09 PROCEDURE — 70486 CT MAXILLOFACIAL W/O DYE: CPT

## 2024-06-09 PROCEDURE — 73620 X-RAY EXAM OF FOOT: CPT

## 2024-06-09 PROCEDURE — 70450 CT HEAD/BRAIN W/O DYE: CPT

## 2024-06-09 PROCEDURE — 73070 X-RAY EXAM OF ELBOW: CPT

## 2024-06-09 PROCEDURE — 63710000001 ONDANSETRON ODT 4 MG TABLET DISPERSIBLE: Performed by: EMERGENCY MEDICINE

## 2024-06-09 RX ORDER — HYDROCODONE BITARTRATE AND ACETAMINOPHEN 5; 325 MG/1; MG/1
1 TABLET ORAL ONCE
Status: COMPLETED | OUTPATIENT
Start: 2024-06-09 | End: 2024-06-09

## 2024-06-09 RX ORDER — ONDANSETRON 4 MG/1
4 TABLET, ORALLY DISINTEGRATING ORAL ONCE
Status: COMPLETED | OUTPATIENT
Start: 2024-06-09 | End: 2024-06-09

## 2024-06-09 RX ORDER — IBUPROFEN 600 MG/1
600 TABLET ORAL EVERY 8 HOURS PRN
Qty: 21 TABLET | Refills: 0 | Status: SHIPPED | OUTPATIENT
Start: 2024-06-09 | End: 2024-06-16

## 2024-06-09 RX ADMIN — HYDROCODONE BITARTRATE AND ACETAMINOPHEN 1 TABLET: 5; 325 TABLET ORAL at 01:27

## 2024-06-09 RX ADMIN — ONDANSETRON 4 MG: 4 TABLET, ORALLY DISINTEGRATING ORAL at 01:27

## 2024-06-09 NOTE — ED TRIAGE NOTES
PT PRESENTS TO ED WITH PARENTS AFTER A FALLING OUTSIDE WHILE PLAYING A GAME, PT HIT LEFT SIDE OF FACE AND HEAD ON A BUMPER, PT REPORTS SHE HAD BLURRY VISION AFTER FALL THAT HAS NOW RESIDED, NO LOC, LEFT ELBOW INJURY, PAIN AND SWELLING. ABRASIONS NOTED ON LEFT ELBOW. PT REPORTS LEFT MEDIAL BACK PAIN UNDER SCAPULA.     LEFT SIDED FACIAL SWELLING AND BRUISING NOTED.

## 2024-06-09 NOTE — DISCHARGE INSTRUCTIONS
Please apply ice to the areas of pain and swelling for 20 minutes 5-6 times a day for the next 2 days.    Please return emergency room immediately for intractable headache, vomiting, shortness of breath, chest pain, near passing out, passing out, numbness or weakness in your arms or legs, difficulties with bladder or bowel function or any new symptoms or concerns with

## 2024-06-09 NOTE — ED PROVIDER NOTES
Time: 1:41 AM EDT  Date of encounter:  6/9/2024  Independent Historian/Clinical History and Information was obtained by:   Patient  Chief Complaint: Fall    History is limited by: N/A    History of Present Illness:  Patient is a 18 y.o. year old female who presents to the emergency department for evaluation of fall.  Patient states that she was playing hide and go seek with her sister.  The patient states that she was running around the car and slipped.  The patient states she fell and struck her head on the bumper.  Patient has no loss of conscious but does state that she was dazed.  She notes some slight change in her vision initially but that resolved.  She has complaints of forehead and left-sided facial pain.  He has no double vision.  She denies any difficulties walking or with coordination.  She has no numbness or weakness in her arms or legs.  She has no neck or back pain.  She does note pain at the left elbow and left foot.  He also notes pain in the area of the left lateral posterior ribs.  She denies any shortness of breath.  She denies any abdominal pain.  She denies pregnancy.    HPI    Patient Care Team  Primary Care Provider: Ron Briggs MD    Past Medical History:     No Known Allergies  Past Medical History:   Diagnosis Date    Gall stones     Heart murmur      Past Surgical History:   Procedure Laterality Date    CHOLECYSTECTOMY N/A 1/11/2022    Procedure: CHOLECYSTECTOMY LAPAROSCOPIC;  Surgeon: Charanjit Zambrano MD;  Location: [facility] OR;  Service: General;  Laterality: N/A;     History reviewed. No pertinent family history.    Home Medications:  Prior to Admission medications    Medication Sig Start Date End Date Taking? Authorizing Provider   azelastine (ASTELIN) 0.1 % nasal spray 2 sprays into the nostril(s) as directed by provider 2 (Two) Times a Day. Use in each nostril as directed 12/11/22   Larry Murillo PA   brompheniramine-pseudoephedrine-DM 30-2-10 MG/5ML syrup Take  10 mL by mouth 4 (Four) Times a Day As Needed for Congestion, Cough or Allergies. 12/11/22   Larry Murillo PA   fluticasone (FLONASE) 50 MCG/ACT nasal spray 2 sprays into the nostril(s) as directed by provider Daily. 12/11/22   Larry Murillo PA   ibuprofen (ADVIL,MOTRIN) 600 MG tablet Take 1 tablet by mouth Every 6 (Six) Hours As Needed for Mild Pain.    Sarthak Madden MD   norethindrone-ethinyl estradiol (FEMHRT 1/5) 1-5 MG-MCG tablet Take  by mouth Daily.    ProviderSarthak MD   oxyCODONE-acetaminophen (Percocet) 5-325 MG per tablet Take 1 tablet by mouth Every 6 (Six) Hours As Needed for Moderate Pain . 1/12/22   Charanjit Zambrano MD   polyethylene glycol (MIRALAX) 17 g packet Take 17 g by mouth Daily. 1/12/22   Charanjit Zambrano MD        Social History:   Social History     Tobacco Use    Smoking status: Never    Smokeless tobacco: Never   Vaping Use    Vaping status: Never Used   Substance Use Topics    Alcohol use: Never    Drug use: Never         Review of Systems:  Review of Systems   Constitutional:  Negative for chills, diaphoresis and fever.   HENT:  Positive for facial swelling. Negative for congestion, postnasal drip, rhinorrhea and sore throat.    Eyes:  Negative for photophobia.   Respiratory:  Negative for cough, chest tightness and shortness of breath.    Cardiovascular:  Negative for chest pain, palpitations and leg swelling.   Gastrointestinal:  Negative for abdominal pain, diarrhea, nausea and vomiting.   Genitourinary:  Negative for difficulty urinating, dysuria, flank pain, frequency, hematuria and urgency.   Musculoskeletal:  Positive for arthralgias and back pain. Negative for neck pain and neck stiffness.   Skin:  Negative for pallor and rash.   Neurological:  Negative for dizziness, syncope, weakness, numbness and headaches.   Hematological:  Negative for adenopathy. Does not bruise/bleed easily.   Psychiatric/Behavioral: Negative.          Physical  "Exam:  /71 (BP Location: Left arm, Patient Position: Sitting)   Pulse 81   Temp 98.6 °F (37 °C) (Oral)   Resp 24   Ht 172.7 cm (68\")   Wt 115 kg (252 lb 10.4 oz)   LMP 05/27/2024 (Approximate)   SpO2 99%   Breastfeeding No   BMI 38.41 kg/m²     Physical Exam  Vitals and nursing note reviewed.   Constitutional:       General: She is not in acute distress.     Appearance: Normal appearance. She is not ill-appearing, toxic-appearing or diaphoretic.   HENT:      Head: Normocephalic and atraumatic. Contusion present. No abrasion, masses, right periorbital erythema, left periorbital erythema or laceration. Hair is normal.      Jaw: There is normal jaw occlusion. Swelling present.        Right Ear: No hemotympanum.      Left Ear: No hemotympanum.      Nose: No nasal deformity, septal deviation, signs of injury or nasal tenderness.      Right Nostril: No epistaxis or septal hematoma.      Left Nostril: No epistaxis or septal hematoma.      Mouth/Throat:      Mouth: Mucous membranes are moist. No injury or oral lesions.   Eyes:      Extraocular Movements: Extraocular movements intact.      Pupils: Pupils are equal, round, and reactive to light.   Cardiovascular:      Rate and Rhythm: Normal rate and regular rhythm.      Pulses: Normal pulses.           Carotid pulses are 2+ on the right side and 2+ on the left side.       Radial pulses are 2+ on the right side and 2+ on the left side.        Femoral pulses are 2+ on the right side and 2+ on the left side.       Popliteal pulses are 2+ on the right side and 2+ on the left side.        Dorsalis pedis pulses are 2+ on the right side and 2+ on the left side.        Posterior tibial pulses are 2+ on the right side and 2+ on the left side.      Heart sounds: Normal heart sounds. No murmur heard.  Pulmonary:      Effort: Pulmonary effort is normal. No accessory muscle usage, respiratory distress or retractions.      Breath sounds: Normal breath sounds. No stridor or " decreased air movement. No wheezing, rhonchi or rales.          Comments: The patient has some very mild soft tissue swelling of the left posterior ribs.  There is no erythema.  There is no ecchymosis.  There is no soft tissue swelling.  There is no crepitance.  Abdominal:      General: Abdomen is flat. There is no distension.      Palpations: Abdomen is soft. There is no mass or pulsatile mass.      Tenderness: There is no abdominal tenderness. There is no right CVA tenderness, left CVA tenderness, guarding or rebound.      Comments: No rigidity   Musculoskeletal:         General: No swelling, tenderness or deformity.      Right shoulder: No swelling, deformity or tenderness. Normal range of motion.      Left shoulder: No swelling, deformity or tenderness. Normal range of motion.      Right elbow: No swelling or deformity. Normal range of motion. No tenderness.      Left elbow: No swelling or deformity. Decreased range of motion. Tenderness present.      Right forearm: Normal.      Left forearm: Normal.      Right wrist: No swelling, deformity, tenderness, bony tenderness or snuff box tenderness. Normal range of motion.      Left wrist: No swelling, deformity, tenderness, bony tenderness or snuff box tenderness. Normal range of motion.      Cervical back: Normal range of motion and neck supple. No deformity, signs of trauma, rigidity or tenderness. No spinous process tenderness or muscular tenderness. Normal range of motion.      Thoracic back: Normal. No swelling, deformity, signs of trauma, spasms, tenderness or bony tenderness. Normal range of motion.      Lumbar back: Normal. No deformity, tenderness or bony tenderness. Normal range of motion.      Right hip: No deformity or tenderness. Normal range of motion.      Left hip: No deformity or tenderness. Normal range of motion.      Right knee: No swelling, deformity or effusion.      Left knee: No swelling, deformity or effusion.      Right lower leg: No edema.       Left lower leg: No edema.      Right ankle: No swelling or deformity. Normal range of motion.      Left ankle: Laceration present. No swelling or deformity. Normal range of motion.      Right foot: Normal.      Left foot: Decreased range of motion. Tenderness and bony tenderness present.      Comments: Patient has an abrasion over the left posterior elbow at the olecranon   Skin:     General: Skin is warm and dry.      Capillary Refill: Capillary refill takes more than 3 seconds. Capillary refill takes less than 2 seconds.      Coloration: Skin is not jaundiced or pale.      Findings: No erythema.   Neurological:      General: No focal deficit present.      Mental Status: She is alert and oriented to person, place, and time. Mental status is at baseline.      Cranial Nerves: Cranial nerves 2-12 are intact. No cranial nerve deficit.      Sensory: Sensation is intact. No sensory deficit.      Motor: Motor function is intact. No weakness or pronator drift.      Coordination: Coordination is intact. Coordination normal.   Psychiatric:         Mood and Affect: Mood normal.         Behavior: Behavior normal.                  Procedures:  Procedures      Medical Decision Making:      Comorbidities that affect care:    None    External Notes reviewed:    None      The following orders were placed and all results were independently analyzed by me:  Orders Placed This Encounter   Procedures    CT Facial Bones Without Contrast    CT Head Without Contrast    XR ELBOW 2 VIEW LEFT    XR Foot 2 View Left    Apply ice to affected area       Medications Given in the Emergency Department:  Medications   HYDROcodone-acetaminophen (NORCO) 5-325 MG per tablet 1 tablet (1 tablet Oral Given 6/9/24 0127)   ondansetron ODT (ZOFRAN-ODT) disintegrating tablet 4 mg (4 mg Oral Given 6/9/24 0127)        ED Course:         Labs:    Lab Results (last 24 hours)       ** No results found for the last 24 hours. **             Imaging:    CT  Facial Bones Without Contrast    Result Date: 6/9/2024  CT FACIAL BONES WO CONTRAST Date of Exam: 6/9/2024 1:34 AM EDT Indication: Fall with injury to the left side of the face. Pain. Comparison: None available. Technique: Axial CT images were obtained from the inferior aspect of the mandible through the frontal sinuses without contrast administration.  Reconstructed coronal and sagittal images were also obtained. Automated exposure control and iterative construction methods were used. Findings: Temporomandibular joints demonstrate normal alignment. The mandible is intact. No acute facial bone fracture is identified. There is mild nasal septal deviation to the left. Paranasal sinuses are clear. There is a left mastoid effusion. The right mastoids and both middle ears appear clear. There is soft tissue contusion on the left cheek extending into the left temporal scalp. The globes appear normal.     1. No acute facial bone fracture. 2. Soft tissue injury to the left cheek and left temporal scalp. 3. Left mastoid effusion. Electronically Signed: Ron Quesada MD  6/9/2024 2:33 AM EDT  Workstation ID: [id number]    CT Head Without Contrast    Result Date: 6/9/2024  CT HEAD WO CONTRAST Date of Exam: 6/9/2024 1:34 AM EDT Indication: Headache after fall with head injury. Comparison: None available. Technique: Axial CT images were obtained of the head without contrast administration.  Reconstructed coronal and sagittal images were also obtained. Automated exposure control and iterative construction methods were used. Findings: Ventricular size and configuration are within normal limits. No acute infarct or hemorrhage. No masses. No skull fracture. Left mastoid effusion is present.     Left mastoid effusion. Head CT is otherwise negative. Please see maxillofacial CT report dictated separately. Electronically Signed: Ron Quesada MD  6/9/2024 2:23 AM EDT  Workstation ID: [id number]    XR Foot 2 View Left    Result Date:  6/9/2024  XR FOOT 2 VW LEFT Date of Exam: 6/9/2024 1:34 AM EDT Indication: Foot pain after fall. Comparison: None available. Findings: 3 views of the left foot. No fracture or dislocation. No bone erosion or destruction. No foreign body.     Negative left foot Electronically Signed: Ron Quesada MD  6/9/2024 1:52 AM EDT  Workstation ID: [id number]    XR ELBOW 2 VIEW LEFT    Result Date: 6/9/2024  XR ELBOW 2 VW LEFT Date of Exam: 6/9/2024 1:30 AM EDT Indication: Pain after fall. Comparison: None available. Findings: No fracture or dislocation. No bone erosion or destruction. No joint effusion. No radiopaque foreign body. Soft tissue injury may be present on the medial side of the elbow.     Soft tissue injury may be present on the medial side, but the elbow otherwise appears negative. Electronically Signed: Ron Quesada MD  6/9/2024 1:49 AM EDT  Workstation ID: [id number]       Differential Diagnosis and Discussion:    Trauma:  Differential diagnosis considered but not limited to were subarachnoid hemorrhage, intracranial bleeding, pneumothorax, cardiac contusion, lung contusion, intra-abdominal bleeding, and compartment syndrome of any extremity or other significant traumatic pathology    All X-rays impressions were independently interpreted by me.    MDM  Number of Diagnoses or Management Options  Contusion of face, initial encounter  Contusion of left elbow, initial encounter  Fall, initial encounter  Foot sprain, left, initial encounter  Rib contusion, left, initial encounter  Diagnosis management comments: CT facial bones and head demonstrate no evidence of facial bone fractures, skull fracture, intracranial hemorrhage    X-ray of the foot and elbow were negative for obvious acute traumatic injury.    Patient was given hydrocodone for pain.  The patient's pain was controlled at time of discharge.    Patient will ice areas of pain and swelling for 20 minutes 5-6 times a day.  The patient will be placed on  ibuprofen.  The patient will follow-up with her doctor at the end of the week for reevaluation.    The patient was given very specific instructions on when and why to return to the emergency room.  The patient voiced understanding and felt comfortable with the discharge instructions.  They would return to the emergency room if necessary.  The patient appears appropriate for discharge and outpatient follow-up.         Amount and/or Complexity of Data Reviewed  Tests in the radiology section of CPT®: reviewed         Social Determinants of Health:    Patient is independent, reliable, and has access to care.       Disposition and Care Coordination:    Discharged: The patient is suitable and stable for discharge with no need for consideration of admission.    I have explained discharge medications and the need for follow up with the patient/caretakers. This was also printed in the discharge instructions. Patient was discharged with the following medications and follow up:      Medication List        Changed      ibuprofen 600 MG tablet  Commonly known as: ADVIL,MOTRIN  Take 1 tablet by mouth Every 8 (Eight) Hours As Needed for Mild Pain for up to 7 days.  What changed: when to take this               Where to Get Your Medications        These medications were sent to [id number] - [place], [place] - [address] AT [facility] [place] - [phone]  - [phone]   [address] [place] [address]      Phone: [phone]   ibuprofen 600 MG tablet      Ron Briggs MD  [address] [address] [phone]    Call on 6/14/2024         Final diagnoses:   Fall, initial encounter   Contusion of left elbow, initial encounter   Foot sprain, left, initial encounter   Contusion of face, initial encounter   Rib contusion, left, initial encounter        ED Disposition       ED Disposition   Discharge    Condition   Stable    Comment   --               This  medical record created using voice recognition software.             Bryson Caraballo DO  06/09/24 [id number]

## 2024-09-24 ENCOUNTER — DOCUMENTATION (OUTPATIENT)
Dept: PHYSICAL THERAPY | Facility: CLINIC | Age: 19
End: 2024-09-24
Payer: MEDICAID

## 2025-03-17 ENCOUNTER — INITIAL PRENATAL (OUTPATIENT)
Dept: OBSTETRICS AND GYNECOLOGY | Facility: CLINIC | Age: 20
End: 2025-03-17
Payer: MEDICAID

## 2025-03-17 DIAGNOSIS — Z34.00 SUPERVISION OF NORMAL FIRST PREGNANCY, ANTEPARTUM: Primary | ICD-10-CM

## 2025-03-17 DIAGNOSIS — O99.210 OBESITY IN PREGNANCY, ANTEPARTUM: ICD-10-CM

## 2025-03-17 PROBLEM — K80.20 CALCULUS OF GALLBLADDER WITHOUT CHOLECYSTITIS WITHOUT OBSTRUCTION: Status: RESOLVED | Noted: 2021-09-13 | Resolved: 2025-03-17

## 2025-03-17 LAB
ABO GROUP BLD: NORMAL
AMPHET+METHAMPHET UR QL: NEGATIVE
AMPHETAMINES UR QL: NEGATIVE
B-HCG UR QL: POSITIVE
BARBITURATES UR QL SCN: NEGATIVE
BASOPHILS # BLD AUTO: 0.02 10*3/MM3 (ref 0–0.2)
BASOPHILS NFR BLD AUTO: 0.4 % (ref 0–1.5)
BENZODIAZ UR QL SCN: NEGATIVE
BLD GP AB SCN SERPL QL: NEGATIVE
BUPRENORPHINE SERPL-MCNC: NEGATIVE NG/ML
CANNABINOIDS SERPL QL: NEGATIVE
COCAINE UR QL: NEGATIVE
DEPRECATED RDW RBC AUTO: 45.6 FL (ref 37–54)
EOSINOPHIL # BLD AUTO: 0.09 10*3/MM3 (ref 0–0.4)
EOSINOPHIL NFR BLD AUTO: 1.7 % (ref 0.3–6.2)
ERYTHROCYTE [DISTWIDTH] IN BLOOD BY AUTOMATED COUNT: 12.9 % (ref 12.3–15.4)
EXPIRATION DATE: ABNORMAL
FENTANYL UR-MCNC: NEGATIVE NG/ML
GLUCOSE UR STRIP-MCNC: NEGATIVE MG/DL
HBV SURFACE AG SERPL QL IA: NORMAL
HCT VFR BLD AUTO: 36.4 % (ref 34–46.6)
HCV AB SER QL: NORMAL
HGB BLD-MCNC: 12.2 G/DL (ref 12–15.9)
HIV 1+2 AB+HIV1 P24 AG SERPL QL IA: NORMAL
IMM GRANULOCYTES # BLD AUTO: 0.02 10*3/MM3 (ref 0–0.05)
IMM GRANULOCYTES NFR BLD AUTO: 0.4 % (ref 0–0.5)
INTERNAL NEGATIVE CONTROL: ABNORMAL
INTERNAL POSITIVE CONTROL: ABNORMAL
LYMPHOCYTES # BLD AUTO: 1.28 10*3/MM3 (ref 0.7–3.1)
LYMPHOCYTES NFR BLD AUTO: 24.9 % (ref 19.6–45.3)
Lab: ABNORMAL
MCH RBC QN AUTO: 32 PG (ref 26.6–33)
MCHC RBC AUTO-ENTMCNC: 33.5 G/DL (ref 31.5–35.7)
MCV RBC AUTO: 95.5 FL (ref 79–97)
METHADONE UR QL SCN: NEGATIVE
MONOCYTES # BLD AUTO: 0.31 10*3/MM3 (ref 0.1–0.9)
MONOCYTES NFR BLD AUTO: 6 % (ref 5–12)
NEUTROPHILS NFR BLD AUTO: 3.43 10*3/MM3 (ref 1.7–7)
NEUTROPHILS NFR BLD AUTO: 66.6 % (ref 42.7–76)
NRBC BLD AUTO-RTO: 0 /100 WBC (ref 0–0.2)
OPIATES UR QL: NEGATIVE
OXYCODONE UR QL SCN: NEGATIVE
PCP UR QL SCN: NEGATIVE
PLATELET # BLD AUTO: 253 10*3/MM3 (ref 140–450)
PMV BLD AUTO: 11.3 FL (ref 6–12)
PROT UR STRIP-MCNC: NEGATIVE MG/DL
RBC # BLD AUTO: 3.81 10*6/MM3 (ref 3.77–5.28)
RH BLD: POSITIVE
TRICYCLICS UR QL SCN: NEGATIVE
WBC NRBC COR # BLD AUTO: 5.15 10*3/MM3 (ref 3.4–10.8)

## 2025-03-17 PROCEDURE — 87086 URINE CULTURE/COLONY COUNT: CPT | Performed by: OBSTETRICS & GYNECOLOGY

## 2025-03-17 PROCEDURE — G0123 SCREEN CERV/VAG THIN LAYER: HCPCS | Performed by: OBSTETRICS & GYNECOLOGY

## 2025-03-17 PROCEDURE — 86803 HEPATITIS C AB TEST: CPT | Performed by: OBSTETRICS & GYNECOLOGY

## 2025-03-17 PROCEDURE — 80081 OBSTETRIC PANEL INC HIV TSTG: CPT | Performed by: OBSTETRICS & GYNECOLOGY

## 2025-03-17 PROCEDURE — 80307 DRUG TEST PRSMV CHEM ANLYZR: CPT | Performed by: OBSTETRICS & GYNECOLOGY

## 2025-03-17 PROCEDURE — 87491 CHLMYD TRACH DNA AMP PROBE: CPT | Performed by: OBSTETRICS & GYNECOLOGY

## 2025-03-17 PROCEDURE — 83020 HEMOGLOBIN ELECTROPHORESIS: CPT | Performed by: OBSTETRICS & GYNECOLOGY

## 2025-03-17 PROCEDURE — 87661 TRICHOMONAS VAGINALIS AMPLIF: CPT | Performed by: OBSTETRICS & GYNECOLOGY

## 2025-03-17 PROCEDURE — 87591 N.GONORRHOEAE DNA AMP PROB: CPT | Performed by: OBSTETRICS & GYNECOLOGY

## 2025-03-17 NOTE — PROGRESS NOTES
[facility]  OB Initial Visit    CC: Here for initial care of pregnancy     Subjective:  19 y.o.  presenting for her first obstetrical visit.  The patient has no complaints today.  She denies any vaginal bleeding or pelvic pain.    LMP: Patient's last menstrual period was 2024.     History:   Last Pap:   Last Completed Pap Smear    This patient has no relevant Health Maintenance data.       Past medical and surgical history, medications, allergies, social history, and OB/GYN history reviewed and updated. Preventative care history, history of abuse/safe environment, vaccine history,  genetic history reviewed and updated    OB History    Para Term  AB Living   1        SAB IAB Ectopic Molar Multiple Live Births              # Outcome Date GA Lbr Jorge/2nd Weight Sex Type Anes PTL Lv   1 Current              Objective:  /80   LMP 2024     Physical Exam  Vitals and nursing note reviewed. Exam conducted with a chaperone present.   Constitutional:       General: She is not in acute distress.     Appearance: Normal appearance. She is not ill-appearing.   HENT:      Head: Normocephalic and atraumatic.      Mouth/Throat:      Mouth: Mucous membranes are moist.      Pharynx: Oropharynx is clear.   Eyes:      Extraocular Movements: Extraocular movements intact.   Cardiovascular:      Rate and Rhythm: Normal rate and regular rhythm.   Pulmonary:      Effort: Pulmonary effort is normal. No respiratory distress.      Breath sounds: Normal breath sounds. No wheezing.   Abdominal:      General: Abdomen is flat. There is no distension.      Palpations: Abdomen is soft. There is no mass.      Tenderness: There is no abdominal tenderness. There is no guarding or rebound.      Hernia: No hernia is present. There is no hernia in the left inguinal area or right inguinal area.   Genitourinary:     General: Normal vulva.      Exam position: Lithotomy position.      Pubic Area: No rash.        Labia:         Right: No rash, tenderness, lesion or injury.         Left: No rash, tenderness, lesion or injury.       Urethra: No prolapse, urethral pain or urethral lesion.      Vagina: No signs of injury and foreign body. No vaginal discharge, erythema, tenderness or bleeding.      Cervix: No cervical motion tenderness, discharge, friability, lesion, erythema or cervical bleeding.      Uterus: Enlarged. Not fixed, not tender and no uterine prolapse.       Adnexa:         Right: No mass or tenderness.          Left: No mass or tenderness.        Comments: The uterus is gravid and approximately 12 weeks in size  Musculoskeletal:         General: No swelling.      Right lower leg: No edema.      Left lower leg: No edema.   Skin:     General: Skin is warm and dry.      Findings: No rash.   Neurological:      Mental Status: She is alert and oriented to person, place, and time.   Psychiatric:         Mood and Affect: Mood normal.         Behavior: Behavior normal.         Thought Content: Thought content normal.         Judgment: Judgment normal.       Assessment and Plan:  Diagnoses and all orders for this visit:    1. Supervision of normal first pregnancy, antepartum (Primary)  Overview:  EDC:    Prenatal genetic screening: Nips    COVID-19 vaccine: Recommended  Flu vaccine: Recommended  Tdap vaccine: Recommended    Assessment & Plan:  Continue prenatal vitamins  Check prenatal labs  Check dating ultrasound  Discussed office visit schedule and call rotation  Reviewed medication safe in pregnancy  Declines prenatal genetic screening  Reviewed nutrition, exercise, and appropriate weight gain in pregnancy    Orders:  -     POC Pregnancy, Urine  -     POC Urinalysis Dipstick  -     Urine Culture - Urine, Urine, Clean Catch  -     Urine Drug Screen - Urine, Clean Catch  -     IGP,CtNgTv,rfx Aptima HPV ASCU  -     OB Panel With HIV  -     Hemoglobinopathy Fractionation Cascade; Future  -     [id number] PLUS Core+ESS  - Blood, Arm, Left  -     US Ob Detail Fetal Anatomy Single or First Gestation; Future  -     US Ob < 14 Weeks Single or First Gestation; Future  -     Hemoglobinopathy Fractionation Cascade  -     Fentanyl, Urine - Urine, Clean Catch    2. Obesity in pregnancy, antepartum  Assessment & Plan:  Discussed exercise, nutrition and appropriate weight gain in pregnancy.        12w2d by exam    Genetic Screening: NIPS    Vaccines: Recommend FLU vaccine this season, R/B discussed  Recommend COVID vaccine, R/B discussed    Counseling: Nutrition discussed, calories, activity/exercise in pregnancy  Discussed dietary restrictions/safety food preparation in pregnancy  Reviewed what to expect prenatal visits, office providers (female and male) and covering [facility]  Appropriate trimester precautions provided, N/V, vag bleeding, cramping  VACCINE importance in pregnancy discussed.  Maternal and fetal risk of not being vaccinated reviewed NLT increased risk maternal/fetal severity of illness/death, PTD, CS, hemorrhage, HTN, possible IUFD.  Significant maternal and fetal/infant benefit w vaccination.  FDA approval and ACOG/SMFM/CDC strong recommendation in pregnancy.  Questions answered.   Questions answered    Return in about 4 weeks (around 4/14/2025) for Recheck.    Otoniel Weathers MD  03/17/2025

## 2025-03-18 ENCOUNTER — PATIENT MESSAGE (OUTPATIENT)
Dept: OBSTETRICS AND GYNECOLOGY | Facility: CLINIC | Age: 20
End: 2025-03-18
Payer: MEDICAID

## 2025-03-18 ENCOUNTER — PATIENT ROUNDING (BHMG ONLY) (OUTPATIENT)
Dept: OBSTETRICS AND GYNECOLOGY | Facility: CLINIC | Age: 20
End: 2025-03-18
Payer: MEDICAID

## 2025-03-18 VITALS — DIASTOLIC BLOOD PRESSURE: 80 MMHG | SYSTOLIC BLOOD PRESSURE: 126 MMHG

## 2025-03-18 LAB — RPR SER QL: NORMAL

## 2025-03-19 LAB
BACTERIA SPEC AEROBE CULT: NORMAL
C TRACH RRNA CVX QL NAA+PROBE: NEGATIVE
CONV .: NORMAL
CYTOLOGIST CVX/VAG CYTO: NORMAL
CYTOLOGY CVX/VAG DOC CYTO: NORMAL
CYTOLOGY CVX/VAG DOC THIN PREP: NORMAL
DX ICD CODE: NORMAL
N GONORRHOEA RRNA CVX QL NAA+PROBE: NEGATIVE
OTHER STN SPEC: NORMAL
RUBV IGG SERPL IA-ACNC: 5.36 INDEX
SERVICE CMNT-IMP: NORMAL
STAT OF ADQ CVX/VAG CYTO-IMP: NORMAL
T VAGINALIS RRNA SPEC QL NAA+PROBE: NEGATIVE

## 2025-03-20 LAB
HGB A MFR BLD ELPH: 97.5 % (ref 96.4–98.8)
HGB A2 MFR BLD ELPH: 2.5 % (ref 1.8–3.2)
HGB F MFR BLD ELPH: 0 % (ref 0–2)
HGB FRACT BLD-IMP: NORMAL
HGB S MFR BLD ELPH: 0 %

## 2025-03-21 LAB
5P15 DELETION (CRI-DU-CHAT): NOT DETECTED
CFDNA.FET/CFDNA.TOTAL SFR FETUS: NORMAL %
CITATION REF LAB TEST: NORMAL
FET 13+18+21+X+Y ANEUP PLAS.CFDNA: NEGATIVE
FET 1P36 DEL RISK WBC.DNA+CFDNA QL: NOT DETECTED
FET 22Q11.2 DEL RISK WBC.DNA+CFDNA QL: NOT DETECTED
FET CHR 11Q23 DEL PLAS.CFDNA QL: NOT DETECTED
FET CHR 15Q11 DEL PLAS.CFDNA QL: NOT DETECTED
FET CHR 21 TS PLAS.CFDNA QL: NEGATIVE
FET CHR 4P16 DEL PLAS.CFDNA QL: NOT DETECTED
FET CHR 8Q24 DEL PLAS.CFDNA QL: NOT DETECTED
FET SEX PLAS.CFDNA DOSAGE CFDNA: NORMAL
FET TS 13 RISK PLAS.CFDNA QL: NEGATIVE
FET TS 18 RISK WBC.DNA+CFDNA QL: NEGATIVE
GA EST FROM CONCEPTION DATE: NORMAL D
GESTATIONAL AGE > 9:: YES
LAB DIRECTOR NAME PROVIDER: NORMAL
LAB DIRECTOR NAME PROVIDER: NORMAL
LABORATORY COMMENT REPORT: NORMAL
LIMITATIONS OF THE TEST: NORMAL
NEGATIVE PREDICTIVE VALUE: NORMAL
PERFORMANCE CHARACTERISTICS: NORMAL
POSITIVE PREDICTIVE VALUE: NORMAL
REF LAB TEST METHOD: NORMAL
SERVICE CMNT-IMP: NORMAL
TEST PERFORMANCE INFO SPEC: NORMAL
TRIOSOMY 16: NOT DETECTED
TRISOMY 22: NOT DETECTED

## 2025-04-15 ENCOUNTER — ROUTINE PRENATAL (OUTPATIENT)
Dept: OBSTETRICS AND GYNECOLOGY | Age: 20
End: 2025-04-15
Payer: MEDICAID

## 2025-04-15 VITALS — SYSTOLIC BLOOD PRESSURE: 110 MMHG | BODY MASS INDEX: 33.3 KG/M2 | DIASTOLIC BLOOD PRESSURE: 73 MMHG | WEIGHT: 219 LBS

## 2025-04-15 DIAGNOSIS — O99.210 OBESITY IN PREGNANCY, ANTEPARTUM: ICD-10-CM

## 2025-04-15 DIAGNOSIS — Z34.00 SUPERVISION OF NORMAL FIRST PREGNANCY, ANTEPARTUM: Primary | ICD-10-CM

## 2025-04-15 LAB
GLUCOSE UR STRIP-MCNC: NEGATIVE MG/DL
PROT UR STRIP-MCNC: NEGATIVE MG/DL

## 2025-04-15 NOTE — ASSESSMENT & PLAN NOTE
Reviewed prenatal labs  Reviewed nips results  Reviewed dating ultrasound and finalized EDC  Anatomy ultrasound in 4 weeks  Continue prenatal vitamin

## 2025-04-15 NOTE — PROGRESS NOTES
[facility]  OB Follow Up Visit    CC: Routine obstetrical visit    Prenatal care complicated by:  Patient Active Problem List   Diagnosis    Supervision of normal first pregnancy, antepartum    Obesity in pregnancy, antepartum     Subjective:   Rere Pate is a 19 y.o.  16w3d patient being seen today for her obstetrical follow up visit. The patient has: No complaints, No leaking fluid, No vaginal bleeding, No contractions  Just beginning to feel early movements    History: Past medical and surgical history, medications, allergies, social history, and obstetrical history all reviewed and updated.    Objective:    Urine glucose/protein - See OB flow sheet      /73   Wt 99.3 kg (219 lb)   LMP 2024   BMI 33.30 kg/m²     General exam: Comfortable, NAD  FHR: 153 BPM   Uterine Size: size equals dates  Pelvic Exam: No    Assessment and Plan:  Diagnoses and all orders for this visit:    1. Supervision of normal first pregnancy, antepartum (Primary)  Overview:  EDC: 2025    Prenatal genetic screening: Nips negative    COVID-19 vaccine: Recommended  Flu vaccine: Recommended  Tdap vaccine: Recommended    Assessment & Plan:  Reviewed prenatal labs  Reviewed nips results  Reviewed dating ultrasound and finalized EDC  Anatomy ultrasound in 4 weeks  Continue prenatal vitamin    Orders:  -     POC Urinalysis Dipstick    2. Obesity in pregnancy, antepartum  Assessment & Plan:  Previously counseled        16w3d  Reassuring pregnancy progress    Counseling: Second trimester precautions  OB precautions, leaking, VB, chato doyle vs PTL/Labor    Questions answered    Return in about 4 weeks (around 2025) for Recheck.    Otoniel Weathers MD  04/15/2025

## 2025-05-13 ENCOUNTER — ROUTINE PRENATAL (OUTPATIENT)
Dept: OBSTETRICS AND GYNECOLOGY | Age: 20
End: 2025-05-13
Payer: MEDICAID

## 2025-05-13 VITALS — BODY MASS INDEX: 33.75 KG/M2 | SYSTOLIC BLOOD PRESSURE: 97 MMHG | DIASTOLIC BLOOD PRESSURE: 67 MMHG | WEIGHT: 222 LBS

## 2025-05-13 DIAGNOSIS — Z34.00 SUPERVISION OF NORMAL FIRST PREGNANCY, ANTEPARTUM: Primary | ICD-10-CM

## 2025-05-13 DIAGNOSIS — O99.210 OBESITY IN PREGNANCY, ANTEPARTUM: ICD-10-CM

## 2025-05-13 LAB
GLUCOSE UR STRIP-MCNC: NEGATIVE MG/DL
PROT UR STRIP-MCNC: NEGATIVE MG/DL

## 2025-05-13 NOTE — PROGRESS NOTES
[facility]  OB Follow Up Visit    CC: Routine obstetrical visit    Prenatal care complicated by:  Patient Active Problem List   Diagnosis    Supervision of normal first pregnancy, antepartum    Obesity in pregnancy, antepartum     Subjective:   Rere Pate is a 19 y.o.  21w1d patient being seen today for her obstetrical follow up visit. The patient has: No complaints, No leaking fluid, No vaginal bleeding, No contractions, Adequate FM    History: Past medical and surgical history, medications, allergies, social history, and obstetrical history all reviewed and updated.    Objective:    Urine glucose/protein - See OB flow sheet      BP 97/67   Wt 101 kg (222 lb)   LMP 2024   BMI 33.75 kg/m²     General exam: Comfortable, NAD  FHR: 145 BPM   Uterine Size:  21 cm  Pelvic Exam: No    Assessment and Plan:  Diagnoses and all orders for this visit:    1. Supervision of normal first pregnancy, antepartum (Primary)  Overview:  EDC: 2025    Prenatal genetic screening: Nips negative    COVID-19 vaccine: Recommended  Flu vaccine: Recommended  Tdap vaccine: Recommended    Assessment & Plan:  Reviewed today's anatomy ultrasound.  The anatomy is normal however limited.  A follow-up study was scheduled in 4 weeks to complete the anatomical survey  Continue prenatal vitamin  1 hour GTT next office visit    Orders:  -     POC Urinalysis Dipstick  -     US Ob 14 + Weeks Single or First Gestation; Future    2. Obesity in pregnancy, antepartum  Assessment & Plan:  Previously counseled        21w1d  Reassuring pregnancy progress    Counseling: Second trimester precautions  OB precautions, leaking, VB, chato doyle vs PTL/Labor    Questions answered    Return in about 4 weeks (around 6/10/2025) for Recheck.    Otoniel Weathers MD  2025

## 2025-05-14 ENCOUNTER — REFERRAL TRIAGE (OUTPATIENT)
Dept: LABOR AND DELIVERY | Facility: HOSPITAL | Age: 20
End: 2025-05-14
Payer: MEDICAID

## 2025-05-14 NOTE — ASSESSMENT & PLAN NOTE
Reviewed today's anatomy ultrasound.  The anatomy is normal however limited.  A follow-up study was scheduled in 4 weeks to complete the anatomical survey  Continue prenatal vitamin  1 hour GTT next office visit

## 2025-05-18 ENCOUNTER — APPOINTMENT (OUTPATIENT)
Dept: ULTRASOUND IMAGING | Facility: HOSPITAL | Age: 20
End: 2025-05-18
Payer: MEDICAID

## 2025-05-18 ENCOUNTER — HOSPITAL ENCOUNTER (OUTPATIENT)
Facility: HOSPITAL | Age: 20
Discharge: HOME | End: 2025-05-19
Attending: OBSTETRICS & GYNECOLOGY | Admitting: OBSTETRICS & GYNECOLOGY
Payer: MEDICAID

## 2025-05-18 PROBLEM — M54.9 BACK PAIN AFFECTING PREGNANCY: Status: ACTIVE | Noted: 2025-05-18

## 2025-05-18 PROBLEM — O99.891 BACK PAIN AFFECTING PREGNANCY: Status: ACTIVE | Noted: 2025-05-18

## 2025-05-18 LAB
ALBUMIN SERPL-MCNC: 3.6 G/DL (ref 3.5–5.2)
ALBUMIN/GLOB SERPL: 1.6 G/DL
ALP SERPL-CCNC: 50 U/L (ref 39–117)
ALT SERPL W P-5'-P-CCNC: 10 U/L (ref 1–33)
ANION GAP SERPL CALCULATED.3IONS-SCNC: 9.2 MMOL/L (ref 5–15)
AST SERPL-CCNC: 11 U/L (ref 1–32)
BACTERIA UR QL AUTO: ABNORMAL /HPF
BASOPHILS # BLD AUTO: 0.01 10*3/MM3 (ref 0–0.2)
BASOPHILS NFR BLD AUTO: 0.1 % (ref 0–1.5)
BILIRUB BLD-MCNC: NEGATIVE MG/DL
BILIRUB SERPL-MCNC: 0.3 MG/DL (ref 0–1.2)
BILIRUB UR QL STRIP: NEGATIVE
BUN SERPL-MCNC: 3 MG/DL (ref 6–20)
BUN/CREAT SERPL: 6 (ref 7–25)
CALCIUM SPEC-SCNC: 8.6 MG/DL (ref 8.6–10.5)
CHLORIDE SERPL-SCNC: 104 MMOL/L (ref 98–107)
CLARITY UR: CLEAR
CLARITY, POC: CLEAR
CO2 SERPL-SCNC: 23.8 MMOL/L (ref 22–29)
COLOR UR: NORMAL
COLOR UR: YELLOW
CREAT SERPL-MCNC: 0.5 MG/DL (ref 0.57–1)
DEPRECATED RDW RBC AUTO: 46.4 FL (ref 37–54)
EGFRCR SERPLBLD CKD-EPI 2021: 137 ML/MIN/1.73
EOSINOPHIL # BLD AUTO: 0.08 10*3/MM3 (ref 0–0.4)
EOSINOPHIL NFR BLD AUTO: 1 % (ref 0.3–6.2)
ERYTHROCYTE [DISTWIDTH] IN BLOOD BY AUTOMATED COUNT: 13.3 % (ref 12.3–15.4)
GLOBULIN UR ELPH-MCNC: 2.3 GM/DL
GLUCOSE SERPL-MCNC: 71 MG/DL (ref 65–99)
GLUCOSE UR STRIP-MCNC: NEGATIVE MG/DL
GLUCOSE UR STRIP-MCNC: NEGATIVE MG/DL
HCT VFR BLD AUTO: 28.8 % (ref 34–46.6)
HGB BLD-MCNC: 10 G/DL (ref 12–15.9)
HGB UR QL STRIP.AUTO: NEGATIVE
HYALINE CASTS UR QL AUTO: ABNORMAL /LPF
IMM GRANULOCYTES # BLD AUTO: 0.04 10*3/MM3 (ref 0–0.05)
IMM GRANULOCYTES NFR BLD AUTO: 0.5 % (ref 0–0.5)
KETONES UR QL STRIP: NEGATIVE
KETONES UR QL: NEGATIVE
LEUKOCYTE EST, POC: NEGATIVE
LEUKOCYTE ESTERASE UR QL STRIP.AUTO: NEGATIVE
LYMPHOCYTES # BLD AUTO: 2.17 10*3/MM3 (ref 0.7–3.1)
LYMPHOCYTES NFR BLD AUTO: 26.7 % (ref 19.6–45.3)
MCH RBC QN AUTO: 33.2 PG (ref 26.6–33)
MCHC RBC AUTO-ENTMCNC: 34.7 G/DL (ref 31.5–35.7)
MCV RBC AUTO: 95.7 FL (ref 79–97)
MONOCYTES # BLD AUTO: 0.46 10*3/MM3 (ref 0.1–0.9)
MONOCYTES NFR BLD AUTO: 5.7 % (ref 5–12)
NEUTROPHILS NFR BLD AUTO: 5.38 10*3/MM3 (ref 1.7–7)
NEUTROPHILS NFR BLD AUTO: 66 % (ref 42.7–76)
NITRITE UR QL STRIP: NEGATIVE
NITRITE UR-MCNC: NEGATIVE MG/ML
NRBC BLD AUTO-RTO: 0 /100 WBC (ref 0–0.2)
PH UR STRIP.AUTO: 6 [PH] (ref 5–8)
PH UR: 6 [PH] (ref 5–8)
PLATELET # BLD AUTO: 203 10*3/MM3 (ref 140–450)
PMV BLD AUTO: 10.3 FL (ref 6–12)
POTASSIUM SERPL-SCNC: 3.4 MMOL/L (ref 3.5–5.2)
PROT SERPL-MCNC: 5.9 G/DL (ref 6–8.5)
PROT UR QL STRIP: NEGATIVE
PROT UR STRIP-MCNC: NEGATIVE MG/DL
RBC # BLD AUTO: 3.01 10*6/MM3 (ref 3.77–5.28)
RBC # UR STRIP: ABNORMAL /HPF
RBC # UR STRIP: NEGATIVE /UL
REF LAB TEST METHOD: ABNORMAL
SODIUM SERPL-SCNC: 137 MMOL/L (ref 136–145)
SP GR UR STRIP: 1.02 (ref 1–1.03)
SP GR UR: 1.02 (ref 1–1.03)
SQUAMOUS #/AREA URNS HPF: ABNORMAL /HPF
UROBILINOGEN UR QL STRIP: NORMAL
UROBILINOGEN UR QL: NORMAL
WBC # UR STRIP: ABNORMAL /HPF
WBC NRBC COR # BLD AUTO: 8.14 10*3/MM3 (ref 3.4–10.8)

## 2025-05-18 PROCEDURE — 85025 COMPLETE CBC W/AUTO DIFF WBC: CPT | Performed by: OBSTETRICS & GYNECOLOGY

## 2025-05-18 PROCEDURE — 25810000003 LACTATED RINGERS PER 1000 ML: Performed by: OBSTETRICS & GYNECOLOGY

## 2025-05-18 PROCEDURE — 87086 URINE CULTURE/COLONY COUNT: CPT | Performed by: OBSTETRICS & GYNECOLOGY

## 2025-05-18 PROCEDURE — 80053 COMPREHEN METABOLIC PANEL: CPT | Performed by: OBSTETRICS & GYNECOLOGY

## 2025-05-18 PROCEDURE — 76817 TRANSVAGINAL US OBSTETRIC: CPT

## 2025-05-18 PROCEDURE — 76775 US EXAM ABDO BACK WALL LIM: CPT

## 2025-05-18 PROCEDURE — 81001 URINALYSIS AUTO W/SCOPE: CPT | Performed by: OBSTETRICS & GYNECOLOGY

## 2025-05-18 PROCEDURE — 25010000002 CEFTRIAXONE PER 250 MG: Performed by: OBSTETRICS & GYNECOLOGY

## 2025-05-18 PROCEDURE — 81002 URINALYSIS NONAUTO W/O SCOPE: CPT | Performed by: OBSTETRICS & GYNECOLOGY

## 2025-05-18 PROCEDURE — 25810000003 LACTATED RINGERS SOLUTION: Performed by: OBSTETRICS & GYNECOLOGY

## 2025-05-18 RX ORDER — ONDANSETRON 4 MG/1
8 TABLET, ORALLY DISINTEGRATING ORAL EVERY 8 HOURS PRN
Status: DISCONTINUED | OUTPATIENT
Start: 2025-05-18 | End: 2025-05-19 | Stop reason: HOSPADM

## 2025-05-18 RX ORDER — LIDOCAINE HYDROCHLORIDE 10 MG/ML
0.5 INJECTION, SOLUTION EPIDURAL; INFILTRATION; INTRACAUDAL; PERINEURAL ONCE AS NEEDED
Status: DISCONTINUED | OUTPATIENT
Start: 2025-05-18 | End: 2025-05-18 | Stop reason: HOSPADM

## 2025-05-18 RX ORDER — CYCLOBENZAPRINE HCL 5 MG
5 TABLET ORAL ONCE
Status: COMPLETED | OUTPATIENT
Start: 2025-05-18 | End: 2025-05-18

## 2025-05-18 RX ORDER — CYCLOBENZAPRINE HCL 5 MG
5 TABLET ORAL 3 TIMES DAILY PRN
Status: DISCONTINUED | OUTPATIENT
Start: 2025-05-19 | End: 2025-05-19 | Stop reason: HOSPADM

## 2025-05-18 RX ORDER — DOCUSATE SODIUM 100 MG/1
100 CAPSULE, LIQUID FILLED ORAL 2 TIMES DAILY PRN
Status: DISCONTINUED | OUTPATIENT
Start: 2025-05-18 | End: 2025-05-19 | Stop reason: HOSPADM

## 2025-05-18 RX ORDER — PRENATAL VIT/IRON FUM/FOLIC AC 27MG-0.8MG
1 TABLET ORAL DAILY
Status: DISCONTINUED | OUTPATIENT
Start: 2025-05-19 | End: 2025-05-19 | Stop reason: HOSPADM

## 2025-05-18 RX ORDER — ACETAMINOPHEN 500 MG
1000 TABLET ORAL EVERY 6 HOURS PRN
Status: DISCONTINUED | OUTPATIENT
Start: 2025-05-18 | End: 2025-05-19 | Stop reason: HOSPADM

## 2025-05-18 RX ORDER — FAMOTIDINE 20 MG/1
20 TABLET, FILM COATED ORAL EVERY 12 HOURS PRN
Status: DISCONTINUED | OUTPATIENT
Start: 2025-05-18 | End: 2025-05-19 | Stop reason: HOSPADM

## 2025-05-18 RX ORDER — SODIUM CHLORIDE 0.9 % (FLUSH) 0.9 %
10 SYRINGE (ML) INJECTION AS NEEDED
Status: DISCONTINUED | OUTPATIENT
Start: 2025-05-18 | End: 2025-05-18 | Stop reason: HOSPADM

## 2025-05-18 RX ORDER — ONDANSETRON 2 MG/ML
4 INJECTION INTRAMUSCULAR; INTRAVENOUS EVERY 8 HOURS PRN
Status: DISCONTINUED | OUTPATIENT
Start: 2025-05-18 | End: 2025-05-19 | Stop reason: HOSPADM

## 2025-05-18 RX ORDER — SODIUM CHLORIDE, SODIUM LACTATE, POTASSIUM CHLORIDE, CALCIUM CHLORIDE 600; 310; 30; 20 MG/100ML; MG/100ML; MG/100ML; MG/100ML
150 INJECTION, SOLUTION INTRAVENOUS CONTINUOUS
Status: DISCONTINUED | OUTPATIENT
Start: 2025-05-18 | End: 2025-05-19 | Stop reason: HOSPADM

## 2025-05-18 RX ORDER — CALCIUM CARBONATE 500 MG/1
2 TABLET, CHEWABLE ORAL DAILY PRN
Status: DISCONTINUED | OUTPATIENT
Start: 2025-05-18 | End: 2025-05-19 | Stop reason: HOSPADM

## 2025-05-18 RX ORDER — SODIUM CHLORIDE 9 MG/ML
40 INJECTION, SOLUTION INTRAVENOUS AS NEEDED
Status: DISCONTINUED | OUTPATIENT
Start: 2025-05-18 | End: 2025-05-18 | Stop reason: HOSPADM

## 2025-05-18 RX ORDER — SODIUM CHLORIDE 0.9 % (FLUSH) 0.9 %
10 SYRINGE (ML) INJECTION EVERY 12 HOURS SCHEDULED
Status: DISCONTINUED | OUTPATIENT
Start: 2025-05-18 | End: 2025-05-18 | Stop reason: HOSPADM

## 2025-05-18 RX ORDER — FAMOTIDINE 10 MG/ML
20 INJECTION, SOLUTION INTRAVENOUS EVERY 12 HOURS PRN
Status: DISCONTINUED | OUTPATIENT
Start: 2025-05-18 | End: 2025-05-19 | Stop reason: HOSPADM

## 2025-05-18 RX ADMIN — SODIUM CHLORIDE 2000 MG: 9 INJECTION INTRAMUSCULAR; INTRAVENOUS; SUBCUTANEOUS at 23:30

## 2025-05-18 RX ADMIN — CYCLOBENZAPRINE HYDROCHLORIDE 5 MG: 5 TABLET, FILM COATED ORAL at 21:26

## 2025-05-18 RX ADMIN — ACETAMINOPHEN 1000 MG: 500 TABLET ORAL at 23:44

## 2025-05-18 RX ADMIN — SODIUM CHLORIDE, POTASSIUM CHLORIDE, SODIUM LACTATE AND CALCIUM CHLORIDE 150 ML/HR: 600; 310; 30; 20 INJECTION, SOLUTION INTRAVENOUS at 23:30

## 2025-05-18 RX ADMIN — SODIUM CHLORIDE, POTASSIUM CHLORIDE, SODIUM LACTATE AND CALCIUM CHLORIDE 1000 ML: 600; 310; 30; 20 INJECTION, SOLUTION INTRAVENOUS at 20:16

## 2025-05-19 VITALS
DIASTOLIC BLOOD PRESSURE: 60 MMHG | OXYGEN SATURATION: 98 % | HEART RATE: 85 BPM | SYSTOLIC BLOOD PRESSURE: 112 MMHG | TEMPERATURE: 98.5 F | RESPIRATION RATE: 18 BRPM

## 2025-05-19 LAB
ALBUMIN SERPL-MCNC: 3.3 G/DL (ref 3.5–5.2)
ALBUMIN/GLOB SERPL: 1.5 G/DL
ALP SERPL-CCNC: 47 U/L (ref 39–117)
ALT SERPL W P-5'-P-CCNC: 8 U/L (ref 1–33)
ANION GAP SERPL CALCULATED.3IONS-SCNC: 8.5 MMOL/L (ref 5–15)
AST SERPL-CCNC: 10 U/L (ref 1–32)
BASOPHILS # BLD AUTO: 0.02 10*3/MM3 (ref 0–0.2)
BASOPHILS NFR BLD AUTO: 0.4 % (ref 0–1.5)
BILIRUB SERPL-MCNC: 0.2 MG/DL (ref 0–1.2)
BUN SERPL-MCNC: 3 MG/DL (ref 6–20)
BUN/CREAT SERPL: 5 (ref 7–25)
CALCIUM SPEC-SCNC: 8.1 MG/DL (ref 8.6–10.5)
CHLORIDE SERPL-SCNC: 107 MMOL/L (ref 98–107)
CO2 SERPL-SCNC: 22.5 MMOL/L (ref 22–29)
CREAT SERPL-MCNC: 0.6 MG/DL (ref 0.57–1)
DEPRECATED RDW RBC AUTO: 47.3 FL (ref 37–54)
EGFRCR SERPLBLD CKD-EPI 2021: 131.2 ML/MIN/1.73
EOSINOPHIL # BLD AUTO: 0.08 10*3/MM3 (ref 0–0.4)
EOSINOPHIL NFR BLD AUTO: 1.5 % (ref 0.3–6.2)
ERYTHROCYTE [DISTWIDTH] IN BLOOD BY AUTOMATED COUNT: 13.6 % (ref 12.3–15.4)
GLOBULIN UR ELPH-MCNC: 2.2 GM/DL
GLUCOSE SERPL-MCNC: 92 MG/DL (ref 65–99)
HCT VFR BLD AUTO: 26.7 % (ref 34–46.6)
HGB BLD-MCNC: 9.2 G/DL (ref 12–15.9)
IMM GRANULOCYTES # BLD AUTO: 0.01 10*3/MM3 (ref 0–0.05)
IMM GRANULOCYTES NFR BLD AUTO: 0.2 % (ref 0–0.5)
LYMPHOCYTES # BLD AUTO: 1.41 10*3/MM3 (ref 0.7–3.1)
LYMPHOCYTES NFR BLD AUTO: 26.5 % (ref 19.6–45.3)
MCH RBC QN AUTO: 33 PG (ref 26.6–33)
MCHC RBC AUTO-ENTMCNC: 34.5 G/DL (ref 31.5–35.7)
MCV RBC AUTO: 95.7 FL (ref 79–97)
MONOCYTES # BLD AUTO: 0.32 10*3/MM3 (ref 0.1–0.9)
MONOCYTES NFR BLD AUTO: 6 % (ref 5–12)
NEUTROPHILS NFR BLD AUTO: 3.48 10*3/MM3 (ref 1.7–7)
NEUTROPHILS NFR BLD AUTO: 65.4 % (ref 42.7–76)
NRBC BLD AUTO-RTO: 0 /100 WBC (ref 0–0.2)
PLATELET # BLD AUTO: 172 10*3/MM3 (ref 140–450)
PMV BLD AUTO: 10.8 FL (ref 6–12)
POTASSIUM SERPL-SCNC: 3.9 MMOL/L (ref 3.5–5.2)
PROT SERPL-MCNC: 5.5 G/DL (ref 6–8.5)
RBC # BLD AUTO: 2.79 10*6/MM3 (ref 3.77–5.28)
SODIUM SERPL-SCNC: 138 MMOL/L (ref 136–145)
WBC NRBC COR # BLD AUTO: 5.32 10*3/MM3 (ref 3.4–10.8)

## 2025-05-19 PROCEDURE — G0463 HOSPITAL OUTPT CLINIC VISIT: HCPCS

## 2025-05-19 PROCEDURE — 25010000002 CEFTRIAXONE PER 250 MG: Performed by: OBSTETRICS & GYNECOLOGY

## 2025-05-19 PROCEDURE — 25010000002 ONDANSETRON PER 1 MG: Performed by: OBSTETRICS & GYNECOLOGY

## 2025-05-19 PROCEDURE — 25810000003 LACTATED RINGERS PER 1000 ML: Performed by: OBSTETRICS & GYNECOLOGY

## 2025-05-19 PROCEDURE — 80053 COMPREHEN METABOLIC PANEL: CPT | Performed by: OBSTETRICS & GYNECOLOGY

## 2025-05-19 PROCEDURE — 85025 COMPLETE CBC W/AUTO DIFF WBC: CPT | Performed by: OBSTETRICS & GYNECOLOGY

## 2025-05-19 RX ORDER — CYCLOBENZAPRINE HCL 10 MG
5 TABLET ORAL 3 TIMES DAILY PRN
Qty: 10 TABLET | Refills: 0 | Status: SHIPPED | OUTPATIENT
Start: 2025-05-19

## 2025-05-19 RX ADMIN — SODIUM CHLORIDE, POTASSIUM CHLORIDE, SODIUM LACTATE AND CALCIUM CHLORIDE 150 ML/HR: 600; 310; 30; 20 INJECTION, SOLUTION INTRAVENOUS at 12:02

## 2025-05-19 RX ADMIN — PRENATAL WITH FERROUS FUM AND FOLIC ACID 1 TABLET: 3080; 920; 120; 400; 22; 1.84; 3; 20; 10; 1; 12; 200; 27; 25; 2 TABLET ORAL at 09:18

## 2025-05-19 RX ADMIN — SODIUM CHLORIDE 2000 MG: 9 INJECTION INTRAMUSCULAR; INTRAVENOUS; SUBCUTANEOUS at 17:24

## 2025-05-19 RX ADMIN — SODIUM CHLORIDE, POTASSIUM CHLORIDE, SODIUM LACTATE AND CALCIUM CHLORIDE 150 ML/HR: 600; 310; 30; 20 INJECTION, SOLUTION INTRAVENOUS at 05:48

## 2025-05-19 RX ADMIN — CYCLOBENZAPRINE HYDROCHLORIDE 5 MG: 5 TABLET, FILM COATED ORAL at 06:02

## 2025-05-19 RX ADMIN — ACETAMINOPHEN 1000 MG: 500 TABLET ORAL at 06:02

## 2025-05-19 RX ADMIN — CYCLOBENZAPRINE HYDROCHLORIDE 5 MG: 5 TABLET, FILM COATED ORAL at 15:43

## 2025-05-19 RX ADMIN — ONDANSETRON 4 MG: 2 INJECTION INTRAMUSCULAR; INTRAVENOUS at 01:23

## 2025-05-19 RX ADMIN — ACETAMINOPHEN 1000 MG: 500 TABLET ORAL at 12:52

## 2025-05-19 NOTE — NURSING NOTE
21 weeks 6 days patient of [facility] [place], Dr Weathers arrived to unit via wheelchair from ED with complaints of constant back pain for 2 weeks. On further assessment, patient stated the back pain is primarily on the right side. Patient reports taking tylenol to decrease pain, but it did not help. Patient ambulated to [facility], clean gown given and clean catch urine obtained, see results. Abdomen palpates soft and non-tender. FHT obtained using doppler, 148-162. TOCO placed on abdomen, no contractions noted. VSS.    Dr Dias on unit and made aware of patient arrival, complaint @ . See orders.

## 2025-05-19 NOTE — PROGRESS NOTES
ANGELA Cornell  Obstetric Progress Note    Subjective     Patient:    The patient feels well.  She describes flank pain markedly reduced now describes it a 4/10 which is down from a 7/10 on admission last night.      Objective     Vital Signs Range for the last 24 hours  Temp:  [98.4 °F (36.9 °C)-98.6 °F (37 °C)] 98.4 °F (36.9 °C)   Temp src: Oral   BP: (104-136)/(55-69) 104/57   Heart Rate:  [71-90] 75   Resp:  [18] 18            Intake/Output Summary (Last 24 hours) at 5/19/2025 0857  Last data filed at 5/19/2025 0600  Gross per 24 hour   Intake 1350 ml   Output --   Net 1350 ml       No intake/output data recorded.    Physical Exam:  General: Patient is well appearing         Cervix:     Dilation:     Effacement:     Station:       Fetal Heart Rate Assessment   Method: Fetal HR Assessment Method: intermittent auscultation, using Doppler   Beats/min: Fetal HR (beats/min): 148 (148-162)   Baseline:     Variability:     Accels:     Decels:       Uterine Assessment   Method: Method: external tocotransducer, palpation, per patient report   Frequency (min): Contraction Frequency (Minutes): no contractions   Ctx Count in 10 min:     Duration:     Intensity: Contraction Intensity: no contractions   Intensity by IUPC:     Resting Tone: Uterine Resting Tone: soft by palpation   Resting Tone by IUPC:     [place] Units:         CBC:      Lab 05/19/25  0937 05/18/25  2321   WBC 5.32 8.14   HEMOGLOBIN 9.2* 10.0*   HEMATOCRIT 26.7* 28.8*   PLATELETS 172 203   NEUTROS ABS 3.48 5.38   IMMATURE GRANS (ABS) 0.01 0.04   LYMPHS ABS 1.41 2.17   MONOS ABS 0.32 0.46   EOS ABS 0.08 0.08   MCV 95.7 95.7        CMP:        Lab 05/19/25  0937 05/18/25  2321   SODIUM 138 137   POTASSIUM 3.9 3.4*   CHLORIDE 107 104   CO2 22.5 23.8   ANION GAP 8.5 9.2   BUN 3* 3*   CREATININE 0.60 0.50*   EGFR 131.2 137.0   GLUCOSE 92 71   CALCIUM 8.1* 8.6   TOTAL PROTEIN 5.5* 5.9*   ALBUMIN 3.3* 3.6   GLOBULIN 2.2 2.3   ALT (SGPT) 8 10   AST (SGOT) 10 11    BILIRUBIN 0.2 0.3   ALK PHOS 47 50        Assessment & Plan       Back pain affecting pregnancy        Assessment:  1.  Intrauterine pregnancy at 22w0d gestation with reactive fetal status.        Plan:  1.  Rocephin initiated last night emperically.  2.  Will move Rocephin dose to 5 pm then discharge to home after her second dose.  She is to come into triage tomorrow around 5 pm for her third and final dose IM.   3.  Continue to strain all urine for possible stone retrieval.  Mild hydronephrosis seen bilaterally (slightly greater Rt>Lt)  4.  Plan of care has been reviewed with patient   5.  All questions have been answered.      Electronically signed by Eduarda Moore MD, 05/19/25, 8:57 AM EDT.

## 2025-05-19 NOTE — PLAN OF CARE
Goal Outcome Evaluation:   Admitted for observation and antibiotic administration after coming in with c/o flank pain. IV access @ right distal forearm - 20G. 1L bolus of LR administered in triage and PO fluid intake heavily encouraged. Patient has been compliant with PO intake throughout the shift. Pain management achieved through flexeril and tylenol as well as kpad use. VS WNL. Patient slept comfortably throughout the night. LR continuous infusion of 150ml/hr. Rocephin given via IVP and flushed with NS before and after administration. Able to ambulate to the bathroom independently. Patient has no complaints at this time. Support person - George (significant other) at the bedside and supportive.

## 2025-05-19 NOTE — DISCHARGE SUMMARY
Date of Discharge:  2025    Discharge Diagnosis:   Back pain affecting pregnancy      Problem List:  Active Hospital Problems    Diagnosis  POA    **Back pain affecting pregnancy [O99.891, M54.9]  Yes      Resolved Hospital Problems   No resolved problems to display.       Presenting Problem/History of Present Illness  Back pain affecting pregnancy [O99.891, M54.9]    Expand All Collapse All    Right lower back and flank pain  Obstetric History and Physical         Chief Complaint   Patient presents with    Back Pain         Subjective  HPI:     Patient is a 19 y.o. female  currently at 21w6d, who presents with right lower back and right flank pain for the past 2 weeks.  Patient reports the pain is intermittent and stabbing in nature. Patient states that heat helps but Tylenol does not touch it.  Patient states she did not take any Tylenol today.     Patient denies fevers and chills.  Patient denies dysuria, denies hematuria, denies sense of incomplete emptying, and reports occasional increased urinary frequency.     Patient reports good fetal movement, denies vaginal bleeding, denies leakage of fluid, and denies lower abdominal pain and cramping..     PNC provided by:  [facility]. Her prenatal care is complicated by   Problem List[]Expand by Default       Patient Active Problem List   Diagnosis    Supervision of normal first pregnancy, antepartum    Obesity in pregnancy, antepartum    Back pain affecting pregnancy                Hospital Course  Patient is a 19 y.o. female presented with right back and flank pain.  She was started on Flexeril and Rocephin.  This morning patient was markedly improved and rated pain a 4/10 which was less than the 7/10 that she was admitted with .  I spoke with pharmacy and the decision was made to move her Rocephin dose up to 5 pm from 11:30 pm and discharge her to home.  She is instructed to return to labor and delivery for her third and final dose of empiric antibiotic.  She  will be discharge to home with flexeril for para-spinous muscle spasms.  .      Procedures Performed  Renal Ultrasound  labs       Consults:   Consults       No orders found for last 30 day(s).            Pertinent Test Results: labs:          Results for orders placed or performed during the hospital encounter of 05/18/25   Urine Culture - Urine, Urine, Clean Catch    Collection Time: 05/18/25  8:17 PM    Specimen: Urine, Clean Catch   Result Value Ref Range    Urine Culture No growth    Urinalysis With Culture If Indicated - Urine, Clean Catch    Collection Time: 05/18/25  8:17 PM    Specimen: Urine, Clean Catch   Result Value Ref Range    Color, UA Yellow Yellow, Straw    Appearance, UA Clear Clear    pH, UA 6.0 5.0 - 8.0    Specific Gravity, UA 1.019 1.005 - 1.030    Glucose, UA Negative Negative    Ketones, UA Negative Negative    Bilirubin, UA Negative Negative    Blood, UA Negative Negative    Protein, UA Negative Negative    Leuk Esterase, UA Negative Negative    Nitrite, UA Negative Negative    Urobilinogen, UA 1.0 E.U./dL 0.2 - 1.0 E.U./dL   Urinalysis, Microscopic Only - Urine, Clean Catch    Collection Time: 05/18/25  8:17 PM    Specimen: Urine, Clean Catch   Result Value Ref Range    RBC, UA 0-2 None Seen, 0-2 /HPF    WBC, UA 3-5 (A) None Seen, 0-2 /HPF    Bacteria, UA Trace (A) None Seen /HPF    Squamous Epithelial Cells, UA 3-6 (A) None Seen, 0-2 /HPF    Hyaline Casts, UA 0-2 None Seen /LPF    Methodology Automated Microscopy    POC Urinalysis Dipstick    Collection Time: 05/18/25  8:32 PM    Specimen: Urine   Result Value Ref Range    Color Dark Yellow Yellow, Straw, Dark Yellow, Anitha    Clarity, UA Clear Clear    Glucose, UA Negative Negative mg/dL    Bilirubin Negative Negative    Ketones, UA Negative Negative    Specific Gravity  1.025 1.005 - 1.030    Blood, UA Negative Negative    pH, Urine 6.0 5.0 - 8.0    Protein, POC Negative Negative mg/dL    Urobilinogen, UA Normal Normal, 0.2 E.U./dL     Leukocytes Negative Negative    Nitrite, UA Negative Negative   Comprehensive Metabolic Panel    Collection Time: 05/18/25 11:21 PM    Specimen: Blood   Result Value Ref Range    Glucose 71 65 - 99 mg/dL    BUN 3 (L) 6 - 20 mg/dL    Creatinine 0.50 (L) 0.57 - 1.00 mg/dL    Sodium 137 136 - 145 mmol/L    Potassium 3.4 (L) 3.5 - 5.2 mmol/L    Chloride 104 98 - 107 mmol/L    CO2 23.8 22.0 - 29.0 mmol/L    Calcium 8.6 8.6 - 10.5 mg/dL    Total Protein 5.9 (L) 6.0 - 8.5 g/dL    Albumin 3.6 3.5 - 5.2 g/dL    ALT (SGPT) 10 1 - 33 U/L    AST (SGOT) 11 1 - 32 U/L    Alkaline Phosphatase 50 39 - 117 U/L    Total Bilirubin 0.3 0.0 - 1.2 mg/dL    Globulin 2.3 gm/dL    A/G Ratio 1.6 g/dL    BUN/Creatinine Ratio 6.0 (L) 7.0 - 25.0    Anion Gap 9.2 5.0 - 15.0 mmol/L    eGFR 137.0 >60.0 mL/min/1.73   CBC Auto Differential    Collection Time: 05/18/25 11:21 PM    Specimen: Blood   Result Value Ref Range    WBC 8.14 3.40 - 10.80 10*3/mm3    RBC 3.01 (L) 3.77 - 5.28 10*6/mm3    Hemoglobin 10.0 (L) 12.0 - 15.9 g/dL    Hematocrit 28.8 (L) 34.0 - 46.6 %    MCV 95.7 79.0 - 97.0 fL    MCH 33.2 (H) 26.6 - 33.0 pg    MCHC 34.7 31.5 - 35.7 g/dL    RDW 13.3 12.3 - 15.4 %    RDW-SD 46.4 37.0 - 54.0 fl    MPV 10.3 6.0 - 12.0 fL    Platelets 203 140 - 450 10*3/mm3    Neutrophil % 66.0 42.7 - 76.0 %    Lymphocyte % 26.7 19.6 - 45.3 %    Monocyte % 5.7 5.0 - 12.0 %    Eosinophil % 1.0 0.3 - 6.2 %    Basophil % 0.1 0.0 - 1.5 %    Immature Grans % 0.5 0.0 - 0.5 %    Neutrophils, Absolute 5.38 1.70 - 7.00 10*3/mm3    Lymphocytes, Absolute 2.17 0.70 - 3.10 10*3/mm3    Monocytes, Absolute 0.46 0.10 - 0.90 10*3/mm3    Eosinophils, Absolute 0.08 0.00 - 0.40 10*3/mm3    Basophils, Absolute 0.01 0.00 - 0.20 10*3/mm3    Immature Grans, Absolute 0.04 0.00 - 0.05 10*3/mm3    nRBC 0.0 0.0 - 0.2 /100 WBC   Comprehensive Metabolic Panel    Collection Time: 05/19/25  9:37 AM    Specimen: Arm, Left; Blood   Result Value Ref Range    Glucose 92 65 - 99 mg/dL    BUN  3 (L) 6 - 20 mg/dL    Creatinine 0.60 0.57 - 1.00 mg/dL    Sodium 138 136 - 145 mmol/L    Potassium 3.9 3.5 - 5.2 mmol/L    Chloride 107 98 - 107 mmol/L    CO2 22.5 22.0 - 29.0 mmol/L    Calcium 8.1 (L) 8.6 - 10.5 mg/dL    Total Protein 5.5 (L) 6.0 - 8.5 g/dL    Albumin 3.3 (L) 3.5 - 5.2 g/dL    ALT (SGPT) 8 1 - 33 U/L    AST (SGOT) 10 1 - 32 U/L    Alkaline Phosphatase 47 39 - 117 U/L    Total Bilirubin 0.2 0.0 - 1.2 mg/dL    Globulin 2.2 gm/dL    A/G Ratio 1.5 g/dL    BUN/Creatinine Ratio 5.0 (L) 7.0 - 25.0    Anion Gap 8.5 5.0 - 15.0 mmol/L    eGFR 131.2 >60.0 mL/min/1.73   CBC Auto Differential    Collection Time: 25  9:37 AM    Specimen: Arm, Left; Blood   Result Value Ref Range    WBC 5.32 3.40 - 10.80 10*3/mm3    RBC 2.79 (L) 3.77 - 5.28 10*6/mm3    Hemoglobin 9.2 (L) 12.0 - 15.9 g/dL    Hematocrit 26.7 (L) 34.0 - 46.6 %    MCV 95.7 79.0 - 97.0 fL    MCH 33.0 26.6 - 33.0 pg    MCHC 34.5 31.5 - 35.7 g/dL    RDW 13.6 12.3 - 15.4 %    RDW-SD 47.3 37.0 - 54.0 fl    MPV 10.8 6.0 - 12.0 fL    Platelets 172 140 - 450 10*3/mm3    Neutrophil % 65.4 42.7 - 76.0 %    Lymphocyte % 26.5 19.6 - 45.3 %    Monocyte % 6.0 5.0 - 12.0 %    Eosinophil % 1.5 0.3 - 6.2 %    Basophil % 0.4 0.0 - 1.5 %    Immature Grans % 0.2 0.0 - 0.5 %    Neutrophils, Absolute 3.48 1.70 - 7.00 10*3/mm3    Lymphocytes, Absolute 1.41 0.70 - 3.10 10*3/mm3    Monocytes, Absolute 0.32 0.10 - 0.90 10*3/mm3    Eosinophils, Absolute 0.08 0.00 - 0.40 10*3/mm3    Basophils, Absolute 0.02 0.00 - 0.20 10*3/mm3    Immature Grans, Absolute 0.01 0.00 - 0.05 10*3/mm3    nRBC 0.0 0.0 - 0.2 /100 WBC        US Ob Transvaginal  Result Date: 2025  US OB TRANSVAGINAL-  Date of exam: 2025 8:32 PM.  Indications: The patient is a 19 y.o.  @ 21w6d with back pain; evaluation of the cervical length.  Comparison: 2025.  TECHNIQUE: Transvaginal ultrasound was performed to evaluate pregnancy. Real time scanning was performed with multiple image  documentation per protocol. A limited obstetric survey was performed.  FINDINGS: The endocervical canal is nondilated and measures approximately 4.98 cm in length. No placenta previa is suggested. The placenta is posterior. The fetal lie is longitudinal with a cephalic presentation. The clinical gestational age is 21 weeks, 6 days. The estimated due date clinically is 2025.       The endocervical canal is nondilated and measures 4.98 cm in length.    Portions of this note were completed with a voice recognition program.  2025 10:07 PM by Jakob Nelson MD on Workstation: [id number]      US Renal Bilateral  Result Date: 2025  US RENAL BILATERAL-  Date of exam: 2025 8:57 PM.  Indications: The patient is a 19 y.o.  @ 21w6d with right flank pain; possible nephrolithiasis and/or other pathology.  Comparison: No comparisons available.  TECHNIQUE/FINDINGS: A bilateral renal ultrasound examination was performed. Two-dimensional (2D) grayscale (B-mode) images as well as color Doppler images are provided for review. The bilateral kidneys and the urinary bladder were evaluated. The abdominal aorta was not evaluated. There is mild hydronephrosis bilaterally, greater on the right than the left. No perinephric fluid collections are appreciated. No renal calyceal stones are seen. No renal cortical thinning is suggested bilaterally. No definite renal atrophy is identified. The renal parenchymal echotexture is within normal limits bilaterally. No suspicious renal mass is seen by ultrasound examination. Grossly, no abnormalities of the urinary bladder are noted. The urinary bladder is partially distended. The bilateral ureteral jets are seen. The right kidney measures approximately 11.6 x 6.1 x 6 cm. It has a volume of 219.7 mL. The left kidney measures approximately 11.9 x 4.8 x 5.2 cm. It has a volume of approximately 155.3 mL.        1. Mild hydronephrosis is seen bilaterally, slightly greater on the right than  the left.     2. No definite nephrolithiasis.     3. No suspicious renal masses are seen.     4. The renal parenchymal echotexture is within normal limits bilaterally.      5. No renal cortical scarring is suggested.     6. No definite focal abnormality of the urinary bladder.     7. Please see above comments for further detail.        Portions of this note were completed with a voice recognition program.    5/18/2025 9:59 PM by Jakob Nelson MD on Workstation: [id number]      Condition on Discharge:  Stable    Vital Signs  Temp:  [98.1 °F (36.7 °C)-98.6 °F (37 °C)] 98.5 °F (36.9 °C)  Heart Rate:  [71-90] 85  Resp:  [18] 18  BP: (104-136)/(47-69) 112/60    Discharge Disposition  Home or Self Care    Discharge Medications     Discharge Medications        New Medications        Instructions Start Date   cyclobenzaprine 10 MG tablet  Commonly known as: FLEXERIL   5 mg, Oral, 3 Times Daily PRN               Discharge Diet       Activity at Discharge  Activity Instructions       Other Activity Restrictions      Type of Restriction: Other    Explain Other Restrictions: No douching or tampons    Pelvic Rest        While having back and flank pain    Sexual Activity Restrictions      Type of Restriction: Sex    Explain Sexual Activity Restrictions: while having back and flank pain.  Resume normal activity once pain resolves            Follow-up Appointments  Future Appointments   Date Time Provider Department Center   5/30/2025  1:50 PM Otoniel Weathers MD [facility] [place] [place] OBG [address] [facility]   6/11/2025  1:00 PM [facility] [place] [place] OBGYN [facility] [address] [facility] OBG [address] [facility]   6/11/2025  1:50 PM Otoniel Weathers MD [facility] [place] [place] OBG [address] [facility]     Additional Instructions for the Follow-ups that You Need to Schedule       Call MD With Problems / Concerns   As directed      Instructions: Fever greater than 101, soaking more than 1 pad per hour or increasing pain.    Order Comments: Instructions: Fever greater than 101, soaking more than 1 pad per hour or increasing pain.          Discharge Follow-up with Specified Provider: Dr Weathers; 1 Week   As directed      To: Dr Weathers   Follow Up: 1 Week   Follow Up Details: Patient has follow up appointment scheduled for 5/30/25 with Dr Weathers                Test Results Pending at Discharge  Pending Labs       Order Current Status    Urine Culture - Urine, Urine, Clean Catch Preliminary result          Eduarda Moore MD    Time: Discharge less than 30 min

## 2025-05-19 NOTE — H&P
Right lower back and right BH [facility]  Obstetric History and Physical    Chief Complaint   Patient presents with    Back Pain       Subjective     HPI:    Patient is a 19 y.o. female  currently at 21w6d, who presents with right lower back and right flank pain for the past 2 weeks.  Patient reports the pain is intermittent and stabbing in nature. Patient states that heat helps but Tylenol does not touch it.  Patient states she did not take any Tylenol today.    Patient denies fevers and chills.  Patient denies dysuria, denies hematuria, denies sense of incomplete emptying, and reports occasional increased urinary frequency.    Patient reports good fetal movement, denies vaginal bleeding, denies leakage of fluid, and denies lower abdominal pain and cramping..    PNC provided by:  [facility]. Her prenatal care is complicated by   Patient Active Problem List   Diagnosis    Supervision of normal first pregnancy, antepartum    Obesity in pregnancy, antepartum    Back pain affecting pregnancy         The following portions of the patients history were reviewed and updated as appropriate:   current medications, allergies, past medical history, past surgical history, past family history, past social history and current problem list.     Prenatal Information:  Prenatal Results       Initial Prenatal Labs       Test Value Reference Range Date Time    Hemoglobin  12.2 g/dL 12.0 - 15.9 25 1053    Hematocrit  36.4 % 34.0 - 46.6 25 1053    Platelets  253 10*3/mm3 140 - 450 25 1053    Rubella IgG  5.36 index Immune >0.99 25 1053    Hepatitis B SAg  Non-Reactive  Non-Reactive 25 1053    Hepatitis C Ab  Non-Reactive  Non-Reactive 25 1053    RPR  Non-Reactive  Non-Reactive 25 1053    T. Pallidum Ab         ABO  A   25 1053    Rh  Positive   25 1053    Antibody Screen  Negative   25 1053    HIV  Non-Reactive  Non-Reactive 25 1053    Urine Culture  <25,000 CFU/mL Normal  Urogenital Yaima   03/17/25 1111    Gonorrhea  Negative  Negative 03/17/25 1111    Chlamydia  Negative  Negative 03/17/25 1111    TSH        HgB A1c         Varicella IgG        Hemoglobinopathy Fractionation  Comment   03/17/25 1053    Hemoglobinopathy (genetic testing)        Cystic fibrosis         Spinal muscular atrophy        Fragile X                  Fetal testing        Test Value Reference Range Date Time    NIPT        MSAFP        AFP-4                  2nd and 3rd Trimester       Test Value Reference Range Date Time    Hemoglobin (repeated)        Hematocrit (repeated)        Platelets   253 10*3/mm3 140 - 450 03/17/25 1053    1 hour GTT         Antibody Screen (repeated)        3rd TM syphilis scrn (repeated)  RPR         3rd TM syphilis scrn (repeated) TP-Ab        3rd TM syphilis screen TB-Ab (FTA)        Syphilis cascade test TP-Ab (EIA)        Syphilis cascade TPPA        GTT Fasting        GTT 1 Hr        GTT 2 Hr        GTT 3 Hr        Group B Strep                  Other testing        Test Value Reference Range Date Time    Parvo IgG         CMV IgG                   Drug Screening       Test Value Reference Range Date Time    Amphetamine Screen  Negative  Negative 03/17/25 1111    Barbiturate Screen  Negative  Negative 03/17/25 1111    Benzodiazepine Screen  Negative  Negative 03/17/25 1111    Methadone Screen  Negative  Negative 03/17/25 1111    Phencyclidine Screen  Negative  Negative 03/17/25 1111    Opiates Screen  Negative  Negative 03/17/25 1111    THC Screen  Negative  Negative 03/17/25 1111    Cocaine Screen  Negative  Negative 03/17/25 1111    Propoxyphene Screen        Buprenorphine Screen  Negative  Negative 03/17/25 1111    Methamphetamine Screen  Negative  Negative 03/17/25 1111    Oxycodone Screen  Negative  Negative 03/17/25 1111    Tricyclic Antidepressants Screen  Negative  Negative 03/17/25 1111              Legend    ^: Historical                          External Prenatal  Results       Pregnancy Outside Results - Transcribed From Office Records - See Scanned Records For Details       Test Value Date Time    ABO  A  25 1053    Rh  Positive  25 1053    Antibody Screen  Negative  25 1053    Varicella IgG       Rubella  5.36 index 25 1053    Hgb  12.2 g/dL 25 1053    Hct  36.4 % 25 1053    HgB A1c        1h GTT       3h GTT Fasting       3h GTT 1 hour       3h GTT 2 hour       3h GTT 3 hour        Gonorrhea (discrete)  Negative  25 1111    Chlamydia (discrete)  Negative  25 1111    RPR  Non-Reactive  25 1053    Syphils cascade: TP-Ab (FTA)       TP-Ab       TP-Ab (EIA)       TPPA       HBsAg  Non-Reactive  25 1053    Herpes Simplex Virus PCR       Herpes Simplex VIrus Culture       HIV  Non-Reactive  25 1053    Hep C RNA Quant PCR       Hep C Antibody  Non-Reactive  25 1053    AFP       NIPT       Cystic Fibrosis (Karolina)       Cystic Fibroisis        Spinal Muscular atrophy       Fragile X       Group B Strep       GBS Susceptibility to Clindamycin       GBS Susceptibility to Erythromycin       Fetal Fibronectin       Genetic Testing, Maternal Blood                 Drug Screening       Test Value Date Time    Urine Drug Screen       Amphetamine Screen  Negative  25 1111    Barbiturate Screen  Negative  25 1111    Benzodiazepine Screen  Negative  25 1111    Methadone Screen  Negative  25 1111    Phencyclidine Screen  Negative  25 1111    Opiates Screen  Negative  25 1111    THC Screen  Negative  25 1111    Cocaine Screen       Propoxyphene Screen       Buprenorphine Screen  Negative  25 1111    Methamphetamine Screen       Oxycodone Screen  Negative  25 1111    Tricyclic Antidepressants Screen  Negative  25 1111              Legend    ^: Historical                             Past OB History:     OB History    Para Term  AB Living   1 0 0 0 0 0    SAB IAB Ectopic Molar Multiple Live Births   0 0 0 0 0 0      # Outcome Date GA Lbr Jorge/2nd Weight Sex Type Anes PTL Lv   1 Current                Past Medical History: Past Medical History:   Diagnosis Date    Anxiety     Gall stones     Heart murmur       Past Surgical History Past Surgical History:   Procedure Laterality Date    CHOLECYSTECTOMY N/A 01/11/2022    Procedure: CHOLECYSTECTOMY LAPAROSCOPIC;  Surgeon: Charanjit Zambrano MD;  Location: [facility] MAIN OR;  Service: General;  Laterality: N/A;    LAPAROSCOPIC CHOLECYSTECTOMY  1/12/2022      Family History: Family History   Problem Relation Age of Onset    Prostate cancer Maternal Grandfather     Stroke Maternal Grandmother     Diabetes Maternal Grandmother       Social History:  reports that she has never smoked. She has never used smokeless tobacco.   reports no history of alcohol use.   reports no history of drug use.        General ROS: Pertinent items are noted in HPI    Home Medications:       Allergies:  No Known Allergies    Objective       Vital Signs Range for the last 24 hours  Temperature: Temp:  [98.4 °F (36.9 °C)] 98.4 °F (36.9 °C)   Temp Source: Temp src: Oral   BP: BP: (136)/(69) 136/69   Pulse: Heart Rate:  [88-90] 88   Respirations: Resp:  [18] 18   SPO2: SpO2:  [98 %] 98 %     Physical Examination:   General appearance - alert, well appearing, and in no distress  Mental status - alert, oriented to person, place, and time  Chest - clear to auscultation, normal respiratory effort  Heart - normal rate, regular rhythm,  Abdomen - soft, nontender, gravid  Pelvic - deferred  Back exam - full range of motion, positive right CVA tenderness to palpation, no left-sided CVA tenderness to palpation  Neurological - alert, oriented, normal speech  Extremities - peripheral pulses normal  Skin - normal coloration and turgor, no suspicious skin lesions noted    Presentation: Not assessed   Cervix:  Exam not performed   Dilation:     Effacement:      Station:         Fetal Heart Rate Assessment   Method: Fetal HR Assessment Method: intermittent auscultation, using Doppler   Beats/min: Fetal HR (beats/min): 148 (148-162)   Baseline:     Variability:     Accels:     Decels:     Tracing Category:       Uterine Assessment   Method: Method: external tocotransducer, palpation, per patient report   Frequency (min): Contraction Frequency (Minutes): no contractions   Ctx Count in 10 min:     Duration:     Intensity: Contraction Intensity: no contractions   Hollister Units:       GBS is unknown     Results for orders placed or performed during the hospital encounter of 05/18/25   Urinalysis, Microscopic Only - Urine, Clean Catch    Specimen: Urine, Clean Catch   Result Value Ref Range    RBC, UA 0-2 None Seen, 0-2 /HPF    WBC, UA 3-5 (A) None Seen, 0-2 /HPF    Bacteria, UA Trace (A) None Seen /HPF    Squamous Epithelial Cells, UA 3-6 (A) None Seen, 0-2 /HPF    Hyaline Casts, UA 0-2 None Seen /LPF    Methodology Automated Microscopy    Urinalysis With Culture If Indicated - Urine, Clean Catch    Specimen: Urine, Clean Catch   Result Value Ref Range    Color, UA Yellow Yellow, Straw    Appearance, UA Clear Clear    pH, UA 6.0 5.0 - 8.0    Specific Gravity, UA 1.019 1.005 - 1.030    Glucose, UA Negative Negative    Ketones, UA Negative Negative    Bilirubin, UA Negative Negative    Blood, UA Negative Negative    Protein, UA Negative Negative    Leuk Esterase, UA Negative Negative    Nitrite, UA Negative Negative    Urobilinogen, UA 1.0 E.U./dL 0.2 - 1.0 E.U./dL   POC Urinalysis Dipstick    Specimen: Urine   Result Value Ref Range    Color Dark Yellow Yellow, Straw, Dark Yellow, Anitha    Clarity, UA Clear Clear    Glucose, UA Negative Negative mg/dL    Bilirubin Negative Negative    Ketones, UA Negative Negative    Specific Gravity  1.025 1.005 - 1.030    Blood, UA Negative Negative    pH, Urine 6.0 5.0 - 8.0    Protein, POC Negative Negative mg/dL    Urobilinogen, UA  Normal Normal, 0.2 E.U./dL    Leukocytes Negative Negative    Nitrite, UA Negative Negative   Results for orders placed or performed in visit on 05/13/25   POC Urinalysis Dipstick    Specimen: Urine   Result Value Ref Range    Glucose, UA Negative Negative mg/dL    Protein, POC Negative Negative mg/dL   Results for orders placed or performed in visit on 04/15/25   POC Urinalysis Dipstick    Specimen: Urine   Result Value Ref Range    Glucose, UA Negative Negative mg/dL    Protein, POC Negative Negative mg/dL   Results for orders placed or performed in visit on 03/17/25   [id number] PLUS Core+ESS - Blood, Arm, Left    Specimen: Arm, Left; Blood   Result Value Ref Range    Gestation Saenz     Fetal Fraction 7%     Gestational Age >9: Yes     Result Negative      Comments Comment     Approved By Comment     TRISOMY 21 (DOWN SYNDROME) Negative     TRISOMY 18 (CROWLEY SYNDROME) Negative     TRISOMY 13 (PATAU SYNDROME) Negative     FETAL SEX Comment     22Q11 DELETION (DIGEORGE) Not Detected     15Q11 DELETION (PW ANGELMAN) Not Detected     11Q23 DELETION (MAX) Not Detected     8Q24 DELETION (TANIYA-GIEDION) Not Detected     5P15 DELETION (Cri-du-chat) Not Detected     4P16 DELETION (RICHARDS-HIRSCHHORN) Not Detected     1P36 DELETION SYNDROME Not Detected     TRIOSOMY 16 Not Detected     TRISOMY 22 Not Detected     NEGATIVE PREDICTIVE VALUE Note     POSITIVE PREDICTIVE VALUE N/A     About The Test Comment     Test Method Comment     Performance Comment     PERFORMANCE CHARACTERISTICS Note     Limitations of the Test Comment     Note Comment     References Comment    IGP,CtNgTv,[id number] Aptima HPV ASCU    Specimen: Cervix; ThinPrep Vial   Result Value Ref Range    Diagnosis Comment     Specimen adequacy: Comment     Clinician Provided ICD-10: Comment     Performed by: Comment     . .     Note: Comment     Method: Comment     Conv .conv Comment     Chlamydia, Nuc. Acid Amp Negative Negative    Gonococcus by  Nucleic Acid Amp Negative Negative    Trichomonas vaginosis Negative Negative   OB Panel Type & Screen    Specimen: Arm, Left; Blood   Result Value Ref Range    ABO Type A     RH type Positive     Antibody Screen Negative    Hemoglobinopathy Fractionation Cascade    Specimen: Arm, Left; Blood   Result Value Ref Range    Hgb F Quant 0.0 0.0 - 2.0 %    Hgb A 97.5 96.4 - 98.8 %    Hgb A2 Quant 2.5 1.8 - 3.2 %    Hgb S 0.0 0.0 %    Hgb Interp. Comment    HIV-1 / O / 2 Ag / Antibody    Specimen: Arm, Left; Blood   Result Value Ref Range    HIV DUO Non-Reactive Non-Reactive   CBC Auto Differential    Specimen: Arm, Left; Blood   Result Value Ref Range    WBC 5.15 3.40 - 10.80 10*3/mm3    RBC 3.81 3.77 - 5.28 10*6/mm3    Hemoglobin 12.2 12.0 - 15.9 g/dL    Hematocrit 36.4 34.0 - 46.6 %    MCV 95.5 79.0 - 97.0 fL    MCH 32.0 26.6 - 33.0 pg    MCHC 33.5 31.5 - 35.7 g/dL    RDW 12.9 12.3 - 15.4 %    RDW-SD 45.6 37.0 - 54.0 fl    MPV 11.3 6.0 - 12.0 fL    Platelets 253 140 - 450 10*3/mm3    Neutrophil % 66.6 42.7 - 76.0 %    Lymphocyte % 24.9 19.6 - 45.3 %    Monocyte % 6.0 5.0 - 12.0 %    Eosinophil % 1.7 0.3 - 6.2 %    Basophil % 0.4 0.0 - 1.5 %    Immature Grans % 0.4 0.0 - 0.5 %    Neutrophils, Absolute 3.43 1.70 - 7.00 10*3/mm3    Lymphocytes, Absolute 1.28 0.70 - 3.10 10*3/mm3    Monocytes, Absolute 0.31 0.10 - 0.90 10*3/mm3    Eosinophils, Absolute 0.09 0.00 - 0.40 10*3/mm3    Basophils, Absolute 0.02 0.00 - 0.20 10*3/mm3    Immature Grans, Absolute 0.02 0.00 - 0.05 10*3/mm3    nRBC 0.0 0.0 - 0.2 /100 WBC     *Note: Due to a large number of results and/or encounters for the requested time period, some results have not been displayed. A complete set of results can be found in Results Review.     US OB TRANSVAGINAL-     Date of exam: 2025 8:32 PM.     Indications: The patient is a 19 y.o.  @ 21w6d with back pain;  evaluation of the cervical length.     Comparison: 2025.     TECHNIQUE:   Transvaginal  ultrasound was performed to evaluate pregnancy. Real time  scanning was performed with multiple image documentation per protocol. A  limited obstetric survey was performed.     FINDINGS:  The endocervical canal is nondilated and measures approximately 4.98 cm  in length. No placenta previa is suggested. The placenta is posterior.  The fetal lie is longitudinal with a cephalic presentation. The clinical  gestational age is 21 weeks, 6 days. The estimated due date clinically  is 2025.        IMPRESSION:  The endocervical canal is nondilated and measures 4.98 cm in length.           Portions of this note were completed with a voice recognition program.     2025 10:07 PM by Jakob Nelson MD on Workstation: [id number]        US RENAL BILATERAL-     Date of exam: 2025 8:57 PM.     Indications: The patient is a 19 y.o.  @ 21w6d with right flank  pain; possible nephrolithiasis and/or other pathology.     Comparison: No comparisons available.     TECHNIQUE/FINDINGS:  A bilateral renal ultrasound examination was performed. Two-dimensional  (2D) grayscale (B-mode) images as well as color Doppler images are  provided for review. The bilateral kidneys and the urinary bladder were  evaluated. The abdominal aorta was not evaluated. There is mild  hydronephrosis bilaterally, greater on the right than the left. No  perinephric fluid collections are appreciated. No renal calyceal stones  are seen. No renal cortical thinning is suggested bilaterally. No  definite renal atrophy is identified. The renal parenchymal echotexture  is within normal limits bilaterally. No suspicious renal mass is seen by  ultrasound examination. Grossly, no abnormalities of the urinary bladder  are noted. The urinary bladder is partially distended. The bilateral  ureteral jets are seen. The right kidney measures approximately 11.6 x  6.1 x 6 cm. It has a volume of 219.7 mL. The left kidney measures  approximately 11.9 x 4.8 x 5.2 cm. It has a  volume of approximately  155.3 mL.        IMPRESSION:     1. Mild hydronephrosis is seen bilaterally, slightly greater on the  right than the left.     2. No definite nephrolithiasis.     3. No suspicious renal masses are seen.     4. The renal parenchymal echotexture is within normal limits  bilaterally.     5. No renal cortical scarring is suggested.     6. No definite focal abnormality of the urinary bladder.     7. Please see above comments for further detail.           Portions of this note were completed with a voice recognition program.     2025 9:59 PM by Jakob Nelson MD on Workstation: [id number]    Assessment & Plan       Back pain affecting pregnancy        Assessment:  1.  Intrauterine pregnancy at 21w6d gestation with reassuring fetal status.    2.  Right sided flank pain, possibly early/evolving pyelonephritis versus nephrolithiasis versus  labor        Plan:  1.  Right sided flank pain/right sided lower back pain - possible early/evolving pyelonephritis  - Offered patient the option of receiving IV antibiotics and then being discharged home on oral antibiotics for presumed early/evolving pyelonephritis versus being admitted for observation and receiving IV antibiotics for presumed early/evolving pyelonephritis  - Patient prefers admission  - Patient reports this discomfort has been going on intermittently for the past 2 weeks  - Patient with right sided CVA tenderness to palpation  - Patient currently afebrile  - Urinalysis with no nitrites, no leuk esterase, no blood - microscopic analysis with 3-5 WBCs, trace bacteria, and 3-6 squamous epithelial cells  - Urine culture pending, will run IVF at 150 cc/hour  - Will check CBC and CMP  - Transvaginal ultrasound performed to assess cervical length - findings as above, no e/o  contractions/labor  - Flexeril 5 mg p.o. x 1 given with perhaps slight relief noted -will continue Flexeril 5 mg p.o. every 8 hours as needed  - Renal ultrasound  performed - findings as above, no evidence of nephrolithiasis    2. FWB  - Will check fetal heart tones every shift    Plan of care has been reviewed with patient and patient agrees.   Risks, benefits of treatment plan have been discussed.  All questions have been answered.        Electronically signed by Sofia Dias MD, 05/18/25, 8:06 PM EDT.

## 2025-05-20 LAB — BACTERIA SPEC AEROBE CULT: NO GROWTH

## 2025-05-30 ENCOUNTER — ROUTINE PRENATAL (OUTPATIENT)
Dept: OBSTETRICS AND GYNECOLOGY | Age: 20
End: 2025-05-30
Payer: MEDICAID

## 2025-05-30 VITALS — SYSTOLIC BLOOD PRESSURE: 110 MMHG | BODY MASS INDEX: 34.97 KG/M2 | DIASTOLIC BLOOD PRESSURE: 70 MMHG | WEIGHT: 230 LBS

## 2025-05-30 DIAGNOSIS — O99.891 BACK PAIN AFFECTING PREGNANCY: ICD-10-CM

## 2025-05-30 DIAGNOSIS — M54.9 BACK PAIN AFFECTING PREGNANCY: ICD-10-CM

## 2025-05-30 DIAGNOSIS — Z34.00 SUPERVISION OF NORMAL FIRST PREGNANCY, ANTEPARTUM: Primary | ICD-10-CM

## 2025-05-30 LAB
GLUCOSE UR STRIP-MCNC: NEGATIVE MG/DL
PROT UR STRIP-MCNC: NEGATIVE MG/DL

## 2025-05-30 NOTE — PROGRESS NOTES
OB FOLLOW UP  CC- Here for care of pregnancy        Rere Pate is a 19 y.o.  23w4d patient being seen today for her obstetrical follow up visit. Patient reports pt here for ER f/u for back pain. Pt taking flexeril and reports pain much better. She admits to active fetal movement.     Her prenatal care is complicated by (and status) :    Patient Active Problem List   Diagnosis    Supervision of normal first pregnancy, antepartum    Obesity in pregnancy, antepartum    Back pain affecting pregnancy           Flu Status:   Covid Status:    ROS -   Patient Reports : back pain improved with flexeril  Patient Denies: Loss of Fluid and Vaginal Spotting  Fetal Movement : normal  All other systems reviewed and are negative.       The additional following portions of the patient's history were reviewed and updated as appropriate: allergies, current medications, past family history, past medical history, past social history, past surgical history, and problem list.    I have reviewed and agree with the HPI, ROS, and historical information as entered above. Phuong Heart, DO    /70   Wt 104 kg (230 lb)   LMP 2024   BMI 34.97 kg/m²       EXAM:     FHT: 143 BPM   Uterine Size: size equals dates  Pelvic Exam: No    Urine glucose/protein: See prenatal flowsheet  All labs from recent hospitalization reviewed:cbc, cmp, ua, urine culture, ob sono and renal sono and they were all normal.     Assessment and Plan  Diagnoses and all orders for this visit:    1. Supervision of normal first pregnancy, antepartum (Primary)  Overview:  EDC: 2025    Prenatal genetic screening: Nips negative    COVID-19 vaccine: Recommended  Flu vaccine: Recommended  Tdap vaccine: Recommended    Orders:  -     POC Urinalysis Dipstick    2. Back pain affecting pregnancy  Assessment & Plan:  Pt hospitalized -25 for back pain. Work up done which included: cbc, cmp, ua, urine culture, ob sono and renal ultrasound. All were  negative. Pt received IV antibiotics for presumed pylo but cultures negative. Pt discharged on flexeril           Pregnancy at 23w4d  Fetal status reassuring.   Activity and Exercise discussed.  Return for keep scheduled ob appt.    Phuong Heart DO  05/30/2025

## 2025-05-30 NOTE — ASSESSMENT & PLAN NOTE
Pt hospitalized 5/18-5/19/25 for back pain. Work up done which included: cbc, cmp, ua, urine culture, ob sono and renal ultrasound. All were negative. Pt received IV antibiotics for presumed pylo but cultures negative. Pt discharged on flexeril

## 2025-06-11 ENCOUNTER — ROUTINE PRENATAL (OUTPATIENT)
Dept: OBSTETRICS AND GYNECOLOGY | Age: 20
End: 2025-06-11
Payer: MEDICAID

## 2025-06-11 VITALS — WEIGHT: 231 LBS | SYSTOLIC BLOOD PRESSURE: 111 MMHG | DIASTOLIC BLOOD PRESSURE: 74 MMHG | BODY MASS INDEX: 35.12 KG/M2

## 2025-06-11 DIAGNOSIS — K21.9 GASTROESOPHAGEAL REFLUX DISEASE WITHOUT ESOPHAGITIS: ICD-10-CM

## 2025-06-11 DIAGNOSIS — Z34.00 SUPERVISION OF NORMAL FIRST PREGNANCY, ANTEPARTUM: Primary | ICD-10-CM

## 2025-06-11 DIAGNOSIS — O99.210 OBESITY IN PREGNANCY, ANTEPARTUM: ICD-10-CM

## 2025-06-11 PROBLEM — O99.891 BACK PAIN AFFECTING PREGNANCY: Status: RESOLVED | Noted: 2025-05-18 | Resolved: 2025-06-11

## 2025-06-11 PROBLEM — M54.9 BACK PAIN AFFECTING PREGNANCY: Status: RESOLVED | Noted: 2025-05-18 | Resolved: 2025-06-11

## 2025-06-11 LAB
DEPRECATED RDW RBC AUTO: 44.7 FL (ref 37–54)
ERYTHROCYTE [DISTWIDTH] IN BLOOD BY AUTOMATED COUNT: 12.6 % (ref 12.3–15.4)
GLUCOSE 1H P GLC SERPL-MCNC: 104 MG/DL (ref 65–139)
GLUCOSE UR STRIP-MCNC: NEGATIVE MG/DL
HCT VFR BLD AUTO: 33.3 % (ref 34–46.6)
HGB BLD-MCNC: 11 G/DL (ref 12–15.9)
MCH RBC QN AUTO: 32.3 PG (ref 26.6–33)
MCHC RBC AUTO-ENTMCNC: 33 G/DL (ref 31.5–35.7)
MCV RBC AUTO: 97.7 FL (ref 79–97)
PLATELET # BLD AUTO: 225 10*3/MM3 (ref 140–450)
PMV BLD AUTO: 11.5 FL (ref 6–12)
PROT UR STRIP-MCNC: NEGATIVE MG/DL
RBC # BLD AUTO: 3.41 10*6/MM3 (ref 3.77–5.28)
WBC NRBC COR # BLD AUTO: 6.33 10*3/MM3 (ref 3.4–10.8)

## 2025-06-11 PROCEDURE — 82950 GLUCOSE TEST: CPT | Performed by: OBSTETRICS & GYNECOLOGY

## 2025-06-11 PROCEDURE — 85027 COMPLETE CBC AUTOMATED: CPT | Performed by: OBSTETRICS & GYNECOLOGY

## 2025-06-11 RX ORDER — FAMOTIDINE 20 MG/1
20 TABLET, FILM COATED ORAL 2 TIMES DAILY
Qty: 60 TABLET | Refills: 3 | Status: SHIPPED | OUTPATIENT
Start: 2025-06-11 | End: 2026-06-11

## 2025-06-11 NOTE — PROGRESS NOTES
[facility]  OB Follow Up Visit    CC: Routine obstetrical visit    Prenatal care complicated by:  Patient Active Problem List   Diagnosis    Supervision of normal first pregnancy, antepartum    Obesity in pregnancy, antepartum     Subjective:   Rere Pate is a 19 y.o.  25w2d patient being seen today for her obstetrical follow up visit. The patient has: No leaking fluid, No vaginal bleeding, No contractions, Adequate FM  Reports increase in heartburn.  Tums really do not help and she is wondering if there is anything else she can take    History: Past medical and surgical history, medications, allergies, social history, and obstetrical history all reviewed and updated.    Objective:    Urine glucose/protein - See OB flow sheet      /74   Wt 105 kg (231 lb)   LMP 2024   BMI 35.12 kg/m²     General exam: Comfortable, NAD  FHR: 157 BPM   Uterine Size: 25 cm  Pelvic Exam: No    Assessment and Plan:  Diagnoses and all orders for this visit:    1. Supervision of normal first pregnancy, antepartum (Primary)  Overview:  EDC: 2025    Prenatal genetic screening: Nips negative    COVID-19 vaccine: Recommended  Flu vaccine: Recommended  Tdap vaccine: Recommended    Assessment & Plan:  1 hour GTT today  Continue prenatal vitamins  Fetal kick counts   labor warnings    Orders:  -     POC Urinalysis Dipstick  -     CBC (No Diff)  -     Gestational Diabetes Screen 1 Hour    2. Obesity in pregnancy, antepartum  Assessment & Plan:  Previously counseled      3. Gastroesophageal reflux disease without esophagitis  -     famotidine (Pepcid) 20 MG tablet; Take 1 tablet by mouth 2 (Two) Times a Day.  Dispense: 60 tablet; Refill: 3      25w2d  Reassuring pregnancy progress    Counseling: OB precautions, leaking, VB, chato doyle vs PTL/Labor  FKC    Questions answered    Return in about 4 weeks (around 2025) for Recheck.    Otoniel Weathers MD  2025

## 2025-06-17 ENCOUNTER — TELEPHONE (OUTPATIENT)
Dept: OBSTETRICS AND GYNECOLOGY | Age: 20
End: 2025-06-17
Payer: MEDICAID

## 2025-06-17 NOTE — TELEPHONE ENCOUNTER
Provider: DR. DORETHA DAVIDSON    The [facility] received a fax that requires your attention. The document has been indexed to the patient's chart for your review.     Reason for sending: REVIEW     Documents Description: REQ FOR BREAST PUMP     Name of Sender: [employer]     Date Indexed: 06/17/25    Notes (if needed): INDEXED INTO CHART AS EXT MED RECS 6/17/25 DATE RECEIVED.

## 2025-07-07 ENCOUNTER — ROUTINE PRENATAL (OUTPATIENT)
Dept: OBSTETRICS AND GYNECOLOGY | Age: 20
End: 2025-07-07
Payer: MEDICAID

## 2025-07-07 VITALS — BODY MASS INDEX: 35.88 KG/M2 | SYSTOLIC BLOOD PRESSURE: 112 MMHG | DIASTOLIC BLOOD PRESSURE: 75 MMHG | WEIGHT: 236 LBS

## 2025-07-07 DIAGNOSIS — Z34.00 SUPERVISION OF NORMAL FIRST PREGNANCY, ANTEPARTUM: Primary | ICD-10-CM

## 2025-07-07 DIAGNOSIS — O99.340 ANXIETY DURING PREGNANCY: ICD-10-CM

## 2025-07-07 DIAGNOSIS — O99.210 OBESITY IN PREGNANCY, ANTEPARTUM: ICD-10-CM

## 2025-07-07 DIAGNOSIS — Z3A.29 29 WEEKS GESTATION OF PREGNANCY: ICD-10-CM

## 2025-07-07 DIAGNOSIS — F41.9 ANXIETY DURING PREGNANCY: ICD-10-CM

## 2025-07-07 LAB
GLUCOSE UR STRIP-MCNC: NEGATIVE MG/DL
PROT UR STRIP-MCNC: NEGATIVE MG/DL

## 2025-07-07 RX ORDER — AVOBENZONE, HOMOSALATE, OCTISALATE, OCTOCRYLENE 30; 40; 45; 26 MG/ML; MG/ML; MG/ML; MG/ML
120 CREAM TOPICAL 4 TIMES DAILY
Qty: 120 EACH | Refills: 3 | Status: SHIPPED | OUTPATIENT
Start: 2025-07-07

## 2025-07-07 RX ORDER — BUSPIRONE HYDROCHLORIDE 5 MG/1
5 TABLET ORAL 3 TIMES DAILY
Qty: 90 TABLET | Refills: 3 | Status: SHIPPED | OUTPATIENT
Start: 2025-07-07

## 2025-07-07 RX ORDER — BLOOD-GLUCOSE METER
KIT MISCELLANEOUS
Qty: 1 EACH | Refills: 0 | Status: SHIPPED | OUTPATIENT
Start: 2025-07-07

## 2025-07-07 NOTE — PROGRESS NOTES
OB FOLLOW UP        Chief Complaint   Patient presents with    Routine Prenatal Visit       Subjective:   Increasing anxiety/depression and her blood sugar.  Denies VB, leaking fluid, current regular cramping or contractions.    Objective:  /75   Wt 107 kg (236 lb)   LMP 2024   BMI 35.88 kg/m²   Uterine Size: size equals dates  FHT: 110-160 BPM    See OB flow for LE edema, cvx exam if performed, and Upro/Uglu    Assessment and Plan:  29w0d  Reassuring pregnancy progress.  Questions answered.  Diagnoses and all orders for this visit:    1. Supervision of normal first pregnancy, antepartum (Primary)  Overview:  EDC: 2025    Prenatal genetic screening: Nips negative    COVID-19 vaccine: Recommended  Flu vaccine: Recommended  Tdap vaccine: Recommended, prescription given 25    Assessment & Plan:  Doing well, having increasing anxiety and worried about her blood sugar.  Tdap prescription given  Will send supplies so patient can begin checking her glucose levels per her request  Referred to behavioral health  Fetal kick counts   labor precautions    Orders:  -     POC Urinalysis Dipstick    2. Anxiety during pregnancy  Overview:  Reports increasing anxiety and depression, wants to start a medication.  R/B reviewed, will try buspirone.  Recommend referral to behavioral health, patient desires.     Orders:  -     busPIRone (BUSPAR) 5 MG tablet; Take 1 tablet by mouth 3 (Three) Times a Day.  Dispense: 90 tablet; Refill: 3  -     Ambulatory Referral to Behavioral Health    3. Obesity in pregnancy, antepartum  Assessment & Plan:  Patient has been check her blood glucose periodically, feels it is elevated.  1hGTT normal, patient would like to check routinely.  Will send supplies to pharmacy, glucose log provided.    Orders:  -     glucose blood test strip; 1 each by Other route 4 (Four) Times a Day. Check BS 4x/day as instructed.  Dispense: 120 each; Refill: 3  -     Lancets misc; Use 120 each 4  (Four) Times a Day. Check blood sugars four times daily as directed  Dispense: 120 each; Refill: 3  -     Blood Glucose Monitoring Suppl (FreeStyle Lite) device; Use to check blood sugar before breakfast and two hours after meals  Dispense: 1 each; Refill: 0    4. 29 weeks gestation of pregnancy      Counseling:    OB precautions, leaking, VB, chato doyle vs PTL/Labor  FKC  Continue PNV.  Importance of healthy eating and exercise.    Return in about 2 weeks (around 7/21/2025) for OB follow up, provider of choice.        Derek Chaudhari, APRN  07/07/2025    [facility]  [facility]  [facility] [place] [address]  Dept: [phone]  Dept Fax: [phone]  Loc: [phone]

## 2025-07-07 NOTE — ASSESSMENT & PLAN NOTE
Patient has been check her blood glucose periodically, feels it is elevated.  1hGTT normal, patient would like to check routinely.  Will send supplies to pharmacy, glucose log provided.

## 2025-07-07 NOTE — ASSESSMENT & PLAN NOTE
Doing well, having increasing anxiety and worried about her blood sugar.  Tdap prescription given  Will send supplies so patient can begin checking her glucose levels per her request  Referred to behavioral health  Fetal kick counts   labor precautions

## 2025-07-23 ENCOUNTER — ROUTINE PRENATAL (OUTPATIENT)
Dept: OBSTETRICS AND GYNECOLOGY | Age: 20
End: 2025-07-23
Payer: MEDICAID

## 2025-07-23 VITALS — BODY MASS INDEX: 36.8 KG/M2 | WEIGHT: 242 LBS | SYSTOLIC BLOOD PRESSURE: 109 MMHG | DIASTOLIC BLOOD PRESSURE: 73 MMHG

## 2025-07-23 DIAGNOSIS — O26.13 POOR WEIGHT GAIN OF PREGNANCY, THIRD TRIMESTER: ICD-10-CM

## 2025-07-23 DIAGNOSIS — Z34.00 SUPERVISION OF NORMAL FIRST PREGNANCY, ANTEPARTUM: Primary | ICD-10-CM

## 2025-07-23 DIAGNOSIS — O99.340 ANXIETY DURING PREGNANCY: ICD-10-CM

## 2025-07-23 DIAGNOSIS — F41.9 ANXIETY DURING PREGNANCY: ICD-10-CM

## 2025-07-23 DIAGNOSIS — O99.210 OBESITY IN PREGNANCY, ANTEPARTUM: ICD-10-CM

## 2025-07-23 LAB
GLUCOSE UR STRIP-MCNC: NEGATIVE MG/DL
PROT UR STRIP-MCNC: NEGATIVE MG/DL

## 2025-07-23 NOTE — PROGRESS NOTES
[facility]  OB Follow Up Visit    CC: Routine obstetrical visit    Prenatal care complicated by:  Patient Active Problem List   Diagnosis    Supervision of normal first pregnancy, antepartum    Obesity in pregnancy, antepartum    Anxiety during pregnancy     Subjective:   Rere Pate is a 19 y.o.  31w2d patient being seen today for her obstetrical follow up visit. The patient has: No complaints, No leaking fluid, No vaginal bleeding, No contractions, Adequate FM    History: Past medical and surgical history, medications, allergies, social history, and obstetrical history all reviewed and updated.    Objective:    Urine glucose/protein - See OB flow sheet      /73   Wt 110 kg (242 lb)   LMP 2024   BMI 36.80 kg/m²     General exam: Comfortable, NAD  FHR: 141 BPM   Uterine Size: 31 cm  Pelvic Exam: No    Assessment and Plan:  Diagnoses and all orders for this visit:    1. Supervision of normal first pregnancy, antepartum (Primary)  Overview:  EDC: 2025    Prenatal genetic screening: Nips negative    COVID-19 vaccine: Recommended  Flu vaccine: Recommended  Tdap vaccine: Recommended, prescription given 25    Assessment & Plan:  Continue prenatal vitamins  Fetal kick counts   labor warnings    Orders:  -     POC Urinalysis Dipstick    2. Poor weight gain of pregnancy, third trimester  -     US Ob 14 + Weeks Single or First Gestation; Future    3. Anxiety during pregnancy  Overview:  Reports increasing anxiety and depression, wants to start a medication.  R/B reviewed, will try buspirone.  Recommend referral to behavioral health, patient desires.     Assessment & Plan:  Since seen improvement with BuSpar.  Continue BuSpar 5 mg 3 times a day.      4. Obesity in pregnancy, antepartum  Assessment & Plan:  Previously counseled        31w2d  Reassuring pregnancy progress    Counseling: OB precautions, leaking, VB, chato doyle vs PTL/Labor  FKC    Questions  answered    Return in about 2 weeks (around 8/6/2025) for Recheck.    Otoniel Weathers MD  07/23/2025

## 2025-07-24 PROBLEM — O26.13 POOR WEIGHT GAIN OF PREGNANCY, THIRD TRIMESTER: Status: ACTIVE | Noted: 2025-07-24

## 2025-08-05 ENCOUNTER — OFFICE VISIT (OUTPATIENT)
Dept: BEHAVIORAL HEALTH | Facility: CLINIC | Age: 20
End: 2025-08-05
Payer: MEDICAID

## 2025-08-05 VITALS
DIASTOLIC BLOOD PRESSURE: 75 MMHG | WEIGHT: 241 LBS | SYSTOLIC BLOOD PRESSURE: 137 MMHG | HEART RATE: 97 BPM | HEIGHT: 68 IN | BODY MASS INDEX: 36.53 KG/M2

## 2025-08-05 DIAGNOSIS — Z13.31 SCREENING FOR DEPRESSION: ICD-10-CM

## 2025-08-05 DIAGNOSIS — F41.1 GENERALIZED ANXIETY DISORDER: Primary | ICD-10-CM

## 2025-08-05 RX ORDER — ESCITALOPRAM OXALATE 5 MG/1
5 TABLET ORAL DAILY
Qty: 30 TABLET | Refills: 2 | Status: SHIPPED | OUTPATIENT
Start: 2025-08-05

## 2025-08-08 ENCOUNTER — TELEPHONE (OUTPATIENT)
Dept: OBSTETRICS AND GYNECOLOGY | Age: 20
End: 2025-08-08
Payer: MEDICAID

## 2025-08-15 ENCOUNTER — ROUTINE PRENATAL (OUTPATIENT)
Dept: OBSTETRICS AND GYNECOLOGY | Age: 20
End: 2025-08-15
Payer: MEDICAID

## 2025-08-15 VITALS — BODY MASS INDEX: 36.49 KG/M2 | DIASTOLIC BLOOD PRESSURE: 68 MMHG | WEIGHT: 240 LBS | SYSTOLIC BLOOD PRESSURE: 104 MMHG

## 2025-08-15 DIAGNOSIS — Z34.00 SUPERVISION OF NORMAL FIRST PREGNANCY, ANTEPARTUM: Primary | ICD-10-CM

## 2025-08-15 DIAGNOSIS — F41.9 ANXIETY DURING PREGNANCY: ICD-10-CM

## 2025-08-15 DIAGNOSIS — O26.13 POOR WEIGHT GAIN OF PREGNANCY, THIRD TRIMESTER: ICD-10-CM

## 2025-08-15 DIAGNOSIS — O99.210 OBESITY IN PREGNANCY, ANTEPARTUM: ICD-10-CM

## 2025-08-15 DIAGNOSIS — O99.019 MATERNAL ANEMIA IN PREGNANCY, ANTEPARTUM: ICD-10-CM

## 2025-08-15 DIAGNOSIS — O99.340 ANXIETY DURING PREGNANCY: ICD-10-CM

## 2025-08-15 LAB
DEPRECATED RDW RBC AUTO: 44.4 FL (ref 37–54)
ERYTHROCYTE [DISTWIDTH] IN BLOOD BY AUTOMATED COUNT: 12.6 % (ref 12.3–15.4)
FERRITIN SERPL-MCNC: 34.5 NG/ML (ref 13–150)
FOLATE SERPL-MCNC: >20 NG/ML (ref 4.78–24.2)
GLUCOSE UR STRIP-MCNC: NEGATIVE MG/DL
HCT VFR BLD AUTO: 34.4 % (ref 34–46.6)
HGB BLD-MCNC: 11.4 G/DL (ref 12–15.9)
IRON 24H UR-MRATE: 93 MCG/DL (ref 37–145)
IRON SATN MFR SERPL: 20 % (ref 20–50)
MCH RBC QN AUTO: 32.7 PG (ref 26.6–33)
MCHC RBC AUTO-ENTMCNC: 33.1 G/DL (ref 31.5–35.7)
MCV RBC AUTO: 98.6 FL (ref 79–97)
PLATELET # BLD AUTO: 229 10*3/MM3 (ref 140–450)
PMV BLD AUTO: 11.4 FL (ref 6–12)
PROT UR STRIP-MCNC: NEGATIVE MG/DL
RBC # BLD AUTO: 3.49 10*6/MM3 (ref 3.77–5.28)
RETICS # AUTO: 0.09 10*6/MM3 (ref 0.02–0.13)
RETICS/RBC NFR AUTO: 2.48 % (ref 0.7–1.9)
TIBC SERPL-MCNC: 463 MCG/DL (ref 298–536)
TRANSFERRIN SERPL-MCNC: 311 MG/DL (ref 200–360)
VIT B12 BLD-MCNC: 398 PG/ML (ref 211–946)
WBC NRBC COR # BLD AUTO: 6.04 10*3/MM3 (ref 3.4–10.8)

## 2025-08-15 PROCEDURE — 82607 VITAMIN B-12: CPT | Performed by: OBSTETRICS & GYNECOLOGY

## 2025-08-15 PROCEDURE — 82728 ASSAY OF FERRITIN: CPT | Performed by: OBSTETRICS & GYNECOLOGY

## 2025-08-15 PROCEDURE — 85045 AUTOMATED RETICULOCYTE COUNT: CPT | Performed by: OBSTETRICS & GYNECOLOGY

## 2025-08-15 PROCEDURE — 82746 ASSAY OF FOLIC ACID SERUM: CPT | Performed by: OBSTETRICS & GYNECOLOGY

## 2025-08-15 PROCEDURE — 83540 ASSAY OF IRON: CPT | Performed by: OBSTETRICS & GYNECOLOGY

## 2025-08-15 PROCEDURE — 85027 COMPLETE CBC AUTOMATED: CPT | Performed by: OBSTETRICS & GYNECOLOGY

## 2025-08-15 PROCEDURE — 84466 ASSAY OF TRANSFERRIN: CPT | Performed by: OBSTETRICS & GYNECOLOGY

## 2025-08-22 ENCOUNTER — ROUTINE PRENATAL (OUTPATIENT)
Dept: OBSTETRICS AND GYNECOLOGY | Age: 20
End: 2025-08-22
Payer: MEDICAID

## 2025-08-22 VITALS — BODY MASS INDEX: 36.8 KG/M2 | SYSTOLIC BLOOD PRESSURE: 118 MMHG | WEIGHT: 242 LBS | DIASTOLIC BLOOD PRESSURE: 81 MMHG

## 2025-08-22 DIAGNOSIS — Z34.00 SUPERVISION OF NORMAL FIRST PREGNANCY, ANTEPARTUM: Primary | ICD-10-CM

## 2025-08-22 DIAGNOSIS — O99.340 ANXIETY DURING PREGNANCY: ICD-10-CM

## 2025-08-22 DIAGNOSIS — F41.9 ANXIETY DURING PREGNANCY: ICD-10-CM

## 2025-08-22 DIAGNOSIS — O99.210 OBESITY IN PREGNANCY, ANTEPARTUM: ICD-10-CM

## 2025-08-22 DIAGNOSIS — O26.13 POOR WEIGHT GAIN OF PREGNANCY, THIRD TRIMESTER: ICD-10-CM

## 2025-08-22 LAB
GLUCOSE UR STRIP-MCNC: NEGATIVE MG/DL
PROT UR STRIP-MCNC: NEGATIVE MG/DL

## 2025-08-27 ENCOUNTER — ROUTINE PRENATAL (OUTPATIENT)
Dept: OBSTETRICS AND GYNECOLOGY | Age: 20
End: 2025-08-27
Payer: MEDICAID

## 2025-08-27 VITALS — DIASTOLIC BLOOD PRESSURE: 69 MMHG | SYSTOLIC BLOOD PRESSURE: 127 MMHG

## 2025-08-27 DIAGNOSIS — O26.13 POOR WEIGHT GAIN OF PREGNANCY, THIRD TRIMESTER: ICD-10-CM

## 2025-08-27 DIAGNOSIS — Z34.00 SUPERVISION OF NORMAL FIRST PREGNANCY, ANTEPARTUM: Primary | ICD-10-CM

## 2025-08-27 DIAGNOSIS — O99.210 OBESITY IN PREGNANCY, ANTEPARTUM: ICD-10-CM

## 2025-08-27 DIAGNOSIS — F41.9 ANXIETY DURING PREGNANCY: ICD-10-CM

## 2025-08-27 DIAGNOSIS — O99.340 ANXIETY DURING PREGNANCY: ICD-10-CM

## 2025-08-27 LAB
GLUCOSE UR STRIP-MCNC: NEGATIVE MG/DL
PROT UR STRIP-MCNC: NEGATIVE MG/DL

## 2025-08-27 PROCEDURE — 87653 STREP B DNA AMP PROBE: CPT | Performed by: OBSTETRICS & GYNECOLOGY

## 2025-08-28 LAB — GP B STREP DNA VAG+RECTUM QL NAA+PROBE: POSITIVE

## (undated) DEVICE — SUT MNCRYL 4/0 PS2 18 IN

## (undated) DEVICE — GLV SURG SENSICARE SLT PF LF 7.5 STRL

## (undated) DEVICE — VICRYL VLT 0 45CM ENDOLOOP: Brand: ENDOLOOP

## (undated) DEVICE — 3M™ IOBAN™ 2 ANTIMICROBIAL INCISE DRAPE 6650EZ: Brand: IOBAN™ 2

## (undated) DEVICE — APPL CHLORAPREP HI/LITE 26ML ORNG

## (undated) DEVICE — LAPAROSCOPIC SCISSORS: Brand: EPIX LAPAROSCOPIC SCISSORS

## (undated) DEVICE — SYR LUERLOK 30CC

## (undated) DEVICE — GLV SURG BIOGEL LTX PF 7 1/2

## (undated) DEVICE — SUT VIC 0 UR6 27IN VCP603H

## (undated) DEVICE — TROCARS: Brand: KII® BALLOON BLUNT TIP SYSTEM

## (undated) DEVICE — TISSUE RETRIEVAL SYSTEM: Brand: INZII RETRIEVAL SYSTEM

## (undated) DEVICE — INTENDED FOR TISSUE SEPARATION, AND OTHER PROCEDURES THAT REQUIRE A SHARP SURGICAL BLADE TO PUNCTURE OR CUT.: Brand: BARD-PARKER ® CARBON RIB-BACK BLADES

## (undated) DEVICE — ENDOPATH XCEL WITH OPTIVIEW TECHNOLOGY BLADELESS TROCARS WITH STABILITY SLEEVES: Brand: ENDOPATH XCEL OPTIVIEW

## (undated) DEVICE — GOWN,REINFORCE,POLY,SIRUS,BREATH SLV,XLG: Brand: MEDLINE

## (undated) DEVICE — SYS CLOSE PORTII CARTR/THOMASN XL

## (undated) DEVICE — GENERAL LAPAROSCOPY-LF: Brand: MEDLINE INDUSTRIES, INC.

## (undated) DEVICE — VISUALIZATION SYSTEM: Brand: CLEARIFY

## (undated) DEVICE — 2, DISPOSABLE SUCTION/IRRIGATOR WITHOUT DISPOSABLE TIP: Brand: STRYKEFLOW

## (undated) DEVICE — LAPAROSCOPIC ELECTRODE WITH A 3/32" PIN CONNECTOR, L-HOOK 5 MM X 27 CM: Brand: CONMED

## (undated) DEVICE — ADHS SKIN SURG TISS VISC PREMIERPRO EXOFIN HI/VISC FAST/DRY

## (undated) DEVICE — ENDOPATH XCEL WITH OPTIVIEW TECHNOLOGY UNIVERSAL TROCAR STABILITY SLEEVES: Brand: ENDOPATH XCEL OPTIVIEW

## (undated) DEVICE — SLV SCD LEG COMFORT KENDALLSCD MD REPROC